# Patient Record
Sex: FEMALE | Race: WHITE | NOT HISPANIC OR LATINO | Employment: OTHER | URBAN - METROPOLITAN AREA
[De-identification: names, ages, dates, MRNs, and addresses within clinical notes are randomized per-mention and may not be internally consistent; named-entity substitution may affect disease eponyms.]

---

## 2020-02-10 ENCOUNTER — TELEPHONE (OUTPATIENT)
Dept: UROLOGY | Facility: MEDICAL CENTER | Age: 70
End: 2020-02-10

## 2020-02-10 NOTE — TELEPHONE ENCOUNTER
Npt calling with insurance information [facility] [id number]   [facility]#[id number]

## 2020-02-21 ENCOUNTER — OFFICE VISIT (OUTPATIENT)
Dept: UROLOGY | Facility: CLINIC | Age: 70
End: 2020-02-21
Payer: MEDICARE

## 2020-02-21 VITALS
HEIGHT: 64 IN | BODY MASS INDEX: 43.02 KG/M2 | WEIGHT: 252 LBS | SYSTOLIC BLOOD PRESSURE: 170 MMHG | DIASTOLIC BLOOD PRESSURE: 90 MMHG | HEART RATE: 89 BPM

## 2020-02-21 DIAGNOSIS — R31.9 HEMATURIA, UNSPECIFIED TYPE: Primary | ICD-10-CM

## 2020-02-21 DIAGNOSIS — R31.29 MICROSCOPIC HEMATURIA: ICD-10-CM

## 2020-02-21 LAB
BACTERIA UR QL AUTO: ABNORMAL /HPF
BILIRUB UR QL STRIP: NEGATIVE
CLARITY UR: CLEAR
COLOR UR: YELLOW
GLUCOSE UR STRIP-MCNC: NEGATIVE MG/DL
HGB UR QL STRIP.AUTO: ABNORMAL
KETONES UR STRIP-MCNC: NEGATIVE MG/DL
LEUKOCYTE ESTERASE UR QL STRIP: NEGATIVE
NITRITE UR QL STRIP: NEGATIVE
NON-SQ EPI CELLS URNS QL MICRO: ABNORMAL /HPF
PH UR STRIP.AUTO: 6 [PH]
POST-VOID RESIDUAL VOLUME, ML POC: 54 ML
PROT UR STRIP-MCNC: NEGATIVE MG/DL
RBC #/AREA URNS AUTO: ABNORMAL /HPF
SL AMB  POCT GLUCOSE, UA: NORMAL
SL AMB LEUKOCYTE ESTERASE,UA: NORMAL
SL AMB POCT BILIRUBIN,UA: NORMAL
SL AMB POCT BLOOD,UA: NORMAL
SL AMB POCT CLARITY,UA: CLEAR
SL AMB POCT COLOR,UA: YELLOW
SL AMB POCT KETONES,UA: NORMAL
SL AMB POCT NITRITE,UA: NORMAL
SL AMB POCT PH,UA: 5
SL AMB POCT SPECIFIC GRAVITY,UA: 1.02
SL AMB POCT URINE PROTEIN: NORMAL
SL AMB POCT UROBILINOGEN: 0.2
SP GR UR STRIP.AUTO: 1.03 (ref 1–1.03)
UROBILINOGEN UR QL STRIP.AUTO: 0.2 E.U./DL
WBC #/AREA URNS AUTO: ABNORMAL /HPF

## 2020-02-21 PROCEDURE — 51798 US URINE CAPACITY MEASURE: CPT | Performed by: PHYSICIAN ASSISTANT

## 2020-02-21 PROCEDURE — 99203 OFFICE O/P NEW LOW 30 MIN: CPT | Performed by: PHYSICIAN ASSISTANT

## 2020-02-21 PROCEDURE — 81001 URINALYSIS AUTO W/SCOPE: CPT | Performed by: PHYSICIAN ASSISTANT

## 2020-02-21 PROCEDURE — 81002 URINALYSIS NONAUTO W/O SCOPE: CPT | Performed by: PHYSICIAN ASSISTANT

## 2020-02-21 RX ORDER — LANOLIN ALCOHOL/MO/W.PET/CERES
2 CREAM (GRAM) TOPICAL
COMMUNITY

## 2020-02-21 RX ORDER — RANITIDINE 150 MG/1
TABLET ORAL
COMMUNITY
Start: 2020-01-10

## 2020-02-21 NOTE — PROGRESS NOTES
1  Hematuria, unspecified type  POCT urine dip    POCT Measure PVR    Urinalysis with microscopic   2  Microscopic hematuria  Basic metabolic panel    CT renal protocol         Assessment and plan:       1  Asymptomatic bacteria  -I reviewed with the patient that her urine cultures revealed very low colony counts of the same bacteria consistent with colonization  Patient is relatively asymptomatic for do not recommend treatment with antibiotics  Reviewed classic signs of urinary infection which she was instructed to contact our office we will concur coordinate formal urine studies  Antibiotics will only be needed during sent symptomatic infections  2  Microscopic hematuria  -persistent microscopic hematuria noted on her studies  We did review that this is likely secondary to her colonization however will do formal evaluation with CT urogram and cystoscopy  Patient verbalized understanding  Lio Pang PA-C      Chief Complaint     Chief Complaint   Patient presents with    Microscopic hematuria     new patient    recurrent uti         History of Present Illness     Emma Wright is a 81 y o  female presenting today as a new patient for recurrent urinary infections  Patient states that she would occasionally have urinary malodor  Every time she has her urine tested it reveals low colony count E coli and or strep  Patient denies any dysuria, suprapubic pressure, gross hematuria, flank pain, nausea, vomiting, fevers, or chills  She does note occasional darker yellow/light brownish colored urine during dehydration  Denies any personal or family history of genitourinary malignancies  Does have a very remote history of smoking approximately 5 pack year history and quit in the 1980s  Urine dip in the office today is leukocyte and nitrite negative, trace blood    Postvoid residual of 54 mL    Laboratory     Lab Results   Component Value Date    CREATININE 0 78 08/29/2016       No results found for: PSA    @RESULTRCNT(1H])@      Review of Systems     Review of Systems   Constitutional: Negative for activity change, appetite change, chills, diaphoresis, fatigue, fever and unexpected weight change  Respiratory: Negative for chest tightness and shortness of breath  Cardiovascular: Negative for chest pain, palpitations and leg swelling  Gastrointestinal: Negative for abdominal distention, abdominal pain, constipation, diarrhea, nausea and vomiting  Genitourinary: Negative for decreased urine volume, difficulty urinating, dysuria, enuresis, flank pain, frequency, genital sores, hematuria and urgency  Musculoskeletal: Negative for back pain, gait problem and myalgias  Skin: Negative for color change, pallor, rash and wound  Psychiatric/Behavioral: Negative for behavioral problems  The patient is not nervous/anxious  Allergies     Allergies   Allergen Reactions    Prevacid [Lansoprazole] Rash       Physical Exam     Physical Exam   Constitutional: She is oriented to person, place, and time  She appears well-developed and well-nourished  No distress  HENT:   Head: Normocephalic and atraumatic  Right Ear: External ear normal    Left Ear: External ear normal    Nose: Nose normal    Eyes: Conjunctivae are normal  Right eye exhibits no discharge  Left eye exhibits no discharge  Neck: Normal range of motion  No tracheal deviation present  Pulmonary/Chest: Effort normal  No respiratory distress  Musculoskeletal: Normal range of motion  She exhibits no edema or deformity  Neurological: She is alert and oriented to person, place, and time  Skin: Skin is warm and dry  She is not diaphoretic  No erythema  No pallor  Psychiatric: She has a normal mood and affect   Her behavior is normal          Vital Signs     Vitals:    02/21/20 1402   BP: 170/90   BP Location: Left arm   Patient Position: Sitting   Cuff Size: Adult   Pulse: 89   Weight: 114 kg (252 lb)   Height: 5' 4" (1 626 m)         Current Medications       Current Outpatient Medications:     aspirin 325 mg tablet, Take 81 mg by mouth daily  , Disp: , Rfl:     Cholecalciferol 50 MCG (2000 UT) CAPS, Take 1 capsule by mouth daily, Disp: , Rfl:     Cyanocobalamin 1000 MCG/15ML LIQD, TAKE ONE TABLET BY MOUTH EVERY DAY, Disp: , Rfl:     glucosamine-chondroitin 500-400 MG tablet, Take 2 tablets by mouth, Disp: , Rfl:     lisinopril (ZESTRIL) 30 mg tablet, Take 30 mg by mouth daily  , Disp: , Rfl:     ranitidine (ZANTAC) 150 mg tablet, TAKE 1 TABLET BY MOUTH TWICE A DAY, Disp: , Rfl:     sertraline (ZOLOFT) 100 mg tablet, Take 100 mg by mouth daily  , Disp: , Rfl:       Active Problems     Patient Active Problem List   Diagnosis    Postmenopausal bleeding    Hypertension    Depression    Sleep apnea         Past Medical History     Past Medical History:   Diagnosis Date    Depression     Hypertension     Sleep apnea          Surgical History     Past Surgical History:   Procedure Laterality Date    CYSTOSCOPY      MI HYSTEROSCOPY,W/ENDO BX N/A 9/6/2016    Procedure: HYSTEROSCOPY; DILATAION & CURETTAGE; POLYPECTOMY ;  Surgeon: Corin Bradshaw MD;  Location: [facility];  Service: Gynecology         Family History     History reviewed  No pertinent family history        Social History     Social History       Radiology

## 2020-03-12 ENCOUNTER — HOSPITAL ENCOUNTER (OUTPATIENT)
Dept: CT IMAGING | Facility: HOSPITAL | Age: 70
Discharge: HOME | End: 2020-03-12
Payer: MEDICARE

## 2020-03-12 DIAGNOSIS — R31.29 MICROSCOPIC HEMATURIA: ICD-10-CM

## 2020-03-12 PROCEDURE — 74178 CT ABD&PLV WO CNTR FLWD CNTR: CPT

## 2020-03-12 RX ADMIN — IOHEXOL 100 ML: 350 INJECTION, SOLUTION INTRAVENOUS at 08:12

## 2020-04-10 ENCOUNTER — TELEPHONE (OUTPATIENT)
Dept: UROLOGY | Facility: CLINIC | Age: 70
End: 2020-04-10

## 2020-09-23 ENCOUNTER — PROCEDURE VISIT (OUTPATIENT)
Dept: UROLOGY | Facility: CLINIC | Age: 70
End: 2020-09-23
Payer: MEDICARE

## 2020-09-23 VITALS
WEIGHT: 251.2 LBS | HEIGHT: 64 IN | SYSTOLIC BLOOD PRESSURE: 142 MMHG | HEART RATE: 78 BPM | DIASTOLIC BLOOD PRESSURE: 98 MMHG | BODY MASS INDEX: 42.88 KG/M2 | TEMPERATURE: 97.7 F

## 2020-09-23 DIAGNOSIS — R31.9 HEMATURIA, UNSPECIFIED TYPE: Primary | ICD-10-CM

## 2020-09-23 LAB
SL AMB  POCT GLUCOSE, UA: NORMAL
SL AMB LEUKOCYTE ESTERASE,UA: NORMAL
SL AMB POCT BILIRUBIN,UA: NORMAL
SL AMB POCT BLOOD,UA: NORMAL
SL AMB POCT CLARITY,UA: CLEAR
SL AMB POCT COLOR,UA: YELLOW
SL AMB POCT KETONES,UA: NORMAL
SL AMB POCT NITRITE,UA: NORMAL
SL AMB POCT PH,UA: 5
SL AMB POCT SPECIFIC GRAVITY,UA: 1.01
SL AMB POCT URINE PROTEIN: NORMAL
SL AMB POCT UROBILINOGEN: 0.2

## 2020-09-23 PROCEDURE — 81002 URINALYSIS NONAUTO W/O SCOPE: CPT | Performed by: UROLOGY

## 2020-09-23 PROCEDURE — 52000 CYSTOURETHROSCOPY: CPT | Performed by: UROLOGY

## 2020-09-23 RX ORDER — ROSUVASTATIN CALCIUM 5 MG/1
5 TABLET, COATED ORAL
COMMUNITY
Start: 2020-08-11

## 2020-09-23 NOTE — PROGRESS NOTES
Office Cystoscopy Procedure Note    Indication:    Hematuria    Informed consent   The risks, benefits, complications, treatment options, and expected outcomes were discussed with the patient  The patient concurred with the proposed plan and provided informed consent  Anesthesia  Lidocaine jelly 2%    Antibiotic prophylaxis   None    Procedure  In the presence of a female nurse, the patient was placed in the supine frog-legged position, was prepped and draped in the usual manner using sterile technique, and 2% lidocaine jelly instilled into the urethra  A 17 F flexible cystoscope was then inserted into the urethra and the urethra and bladder carefully examined  The following findings were noted:    Findings:  Urethra:  Normal  Bladder:  Findings consistent with cystitis cystica, no papillary tumors, urothelial lesions, or concerning findings are present  Ureteral orifices:  Normal  Other findings:  None     Specimens: None                 Complications:    None; patient tolerated the procedure well           Disposition: To home after 30 minute observation  Condition: Stable    Plan:   - patient has now completed a full hematuria evaluation including cystoscopy and contrasted upper tract imaging    SHe has no genitourinary pathology nor signs of malignancy  - at her request I have provided her with the name of a urogynecologist who I recommend for consideration of surgical management for her stress urinary incontinence  - she will follow up with Urology on an as-needed basis, certainly happy to see her again should the need arise

## 2020-10-06 ENCOUNTER — APPOINTMENT (OUTPATIENT)
Dept: LAB | Facility: CLINIC | Age: 70
End: 2020-10-06
Payer: MEDICARE

## 2020-10-06 ENCOUNTER — TRANSCRIBE ORDERS (OUTPATIENT)
Dept: LAB | Facility: CLINIC | Age: 70
End: 2020-10-06

## 2020-10-06 DIAGNOSIS — R31.29 MICROSCOPIC HEMATURIA: ICD-10-CM

## 2020-10-06 DIAGNOSIS — E55.9 VITAMIN D DEFICIENCY: ICD-10-CM

## 2020-10-06 DIAGNOSIS — R73.01 IMPAIRED FASTING GLUCOSE: Primary | ICD-10-CM

## 2020-10-06 DIAGNOSIS — E53.8 VITAMIN B12 DEFICIENCY: ICD-10-CM

## 2020-10-06 DIAGNOSIS — R73.01 IMPAIRED FASTING GLUCOSE: ICD-10-CM

## 2020-10-06 DIAGNOSIS — E78.2 MIXED HYPERLIPIDEMIA: ICD-10-CM

## 2020-10-06 LAB
25(OH)D3 SERPL-MCNC: 39 NG/ML (ref 30–100)
ALBUMIN SERPL BCP-MCNC: 4.1 G/DL (ref 3.5–5)
ALP SERPL-CCNC: 80 U/L (ref 46–116)
ALT SERPL W P-5'-P-CCNC: 25 U/L (ref 12–78)
ANION GAP SERPL CALCULATED.3IONS-SCNC: 2 MMOL/L (ref 4–13)
AST SERPL W P-5'-P-CCNC: 17 U/L (ref 5–45)
BILIRUB SERPL-MCNC: 0.4 MG/DL (ref 0.2–1)
BUN SERPL-MCNC: 19 MG/DL (ref 5–25)
CALCIUM SERPL-MCNC: 10 MG/DL (ref 8.3–10.1)
CHLORIDE SERPL-SCNC: 108 MMOL/L (ref 100–108)
CHOLEST SERPL-MCNC: 142 MG/DL (ref 50–200)
CO2 SERPL-SCNC: 29 MMOL/L (ref 21–32)
CREAT SERPL-MCNC: 0.84 MG/DL (ref 0.6–1.3)
EST. AVERAGE GLUCOSE BLD GHB EST-MCNC: 117 MG/DL
GFR SERPL CREATININE-BSD FRML MDRD: 72 ML/MIN/1.73SQ M
GLUCOSE P FAST SERPL-MCNC: 102 MG/DL (ref 65–99)
HBA1C MFR BLD: 5.7 %
HDLC SERPL-MCNC: 35 MG/DL
LDLC SERPL CALC-MCNC: 74 MG/DL (ref 0–100)
NONHDLC SERPL-MCNC: 107 MG/DL
POTASSIUM SERPL-SCNC: 4.4 MMOL/L (ref 3.5–5.3)
PROT SERPL-MCNC: 7.4 G/DL (ref 6.4–8.2)
SODIUM SERPL-SCNC: 139 MMOL/L (ref 136–145)
TRIGL SERPL-MCNC: 167 MG/DL
VIT B12 SERPL-MCNC: 650 PG/ML (ref 100–900)

## 2020-10-06 PROCEDURE — 82607 VITAMIN B-12: CPT

## 2020-10-06 PROCEDURE — 36415 COLL VENOUS BLD VENIPUNCTURE: CPT

## 2020-10-06 PROCEDURE — 83036 HEMOGLOBIN GLYCOSYLATED A1C: CPT

## 2020-10-06 PROCEDURE — 80053 COMPREHEN METABOLIC PANEL: CPT

## 2020-10-06 PROCEDURE — 80061 LIPID PANEL: CPT

## 2020-10-06 PROCEDURE — 82306 VITAMIN D 25 HYDROXY: CPT

## 2020-11-17 ENCOUNTER — TRANSCRIBE ORDERS (OUTPATIENT)
Dept: ADMINISTRATIVE | Facility: HOSPITAL | Age: 70
End: 2020-11-17

## 2020-11-17 DIAGNOSIS — Z12.31 OTHER SCREENING MAMMOGRAM: Primary | ICD-10-CM

## 2021-02-23 ENCOUNTER — IMMUNIZATIONS (OUTPATIENT)
Dept: FAMILY MEDICINE CLINIC | Facility: HOSPITAL | Age: 71
End: 2021-02-23

## 2021-02-23 DIAGNOSIS — Z23 ENCOUNTER FOR IMMUNIZATION: Primary | ICD-10-CM

## 2021-02-23 PROCEDURE — 91300 SARS-COV-2 / COVID-19 MRNA VACCINE (PFIZER-BIONTECH) 30 MCG: CPT

## 2021-02-23 PROCEDURE — 0001A SARS-COV-2 / COVID-19 MRNA VACCINE (PFIZER-BIONTECH) 30 MCG: CPT

## 2021-03-16 ENCOUNTER — IMMUNIZATIONS (OUTPATIENT)
Dept: FAMILY MEDICINE CLINIC | Facility: HOSPITAL | Age: 71
End: 2021-03-16

## 2021-03-16 DIAGNOSIS — Z23 ENCOUNTER FOR IMMUNIZATION: Primary | ICD-10-CM

## 2021-03-16 PROCEDURE — 91300 SARS-COV-2 / COVID-19 MRNA VACCINE (PFIZER-BIONTECH) 30 MCG: CPT

## 2021-03-16 PROCEDURE — 0002A SARS-COV-2 / COVID-19 MRNA VACCINE (PFIZER-BIONTECH) 30 MCG: CPT

## 2021-06-14 ENCOUNTER — TRANSCRIBE ORDERS (OUTPATIENT)
Dept: LAB | Facility: CLINIC | Age: 71
End: 2021-06-14

## 2021-06-14 ENCOUNTER — APPOINTMENT (OUTPATIENT)
Dept: LAB | Facility: CLINIC | Age: 71
End: 2021-06-14
Payer: MEDICARE

## 2021-06-14 DIAGNOSIS — R73.01 IMPAIRED FASTING GLUCOSE: ICD-10-CM

## 2021-06-14 DIAGNOSIS — E78.2 MIXED HYPERLIPIDEMIA: ICD-10-CM

## 2021-06-14 DIAGNOSIS — I10 ESSENTIAL HYPERTENSION: ICD-10-CM

## 2021-06-14 DIAGNOSIS — I10 ESSENTIAL HYPERTENSION: Primary | ICD-10-CM

## 2021-06-14 LAB
ALBUMIN SERPL BCP-MCNC: 4 G/DL (ref 3.5–5)
ALP SERPL-CCNC: 72 U/L (ref 46–116)
ALT SERPL W P-5'-P-CCNC: 24 U/L (ref 12–78)
ANION GAP SERPL CALCULATED.3IONS-SCNC: 5 MMOL/L (ref 4–13)
AST SERPL W P-5'-P-CCNC: 20 U/L (ref 5–45)
BASOPHILS # BLD AUTO: 0.07 THOUSANDS/ΜL (ref 0–0.1)
BASOPHILS NFR BLD AUTO: 1 % (ref 0–1)
BILIRUB SERPL-MCNC: 0.53 MG/DL (ref 0.2–1)
BUN SERPL-MCNC: 17 MG/DL (ref 5–25)
CALCIUM SERPL-MCNC: 9.5 MG/DL (ref 8.3–10.1)
CHLORIDE SERPL-SCNC: 108 MMOL/L (ref 100–108)
CHOLEST SERPL-MCNC: 139 MG/DL (ref 50–200)
CO2 SERPL-SCNC: 26 MMOL/L (ref 21–32)
CREAT SERPL-MCNC: 0.72 MG/DL (ref 0.6–1.3)
EOSINOPHIL # BLD AUTO: 0.21 THOUSAND/ΜL (ref 0–0.61)
EOSINOPHIL NFR BLD AUTO: 3 % (ref 0–6)
ERYTHROCYTE [DISTWIDTH] IN BLOOD BY AUTOMATED COUNT: 13.5 % (ref 11.6–15.1)
EST. AVERAGE GLUCOSE BLD GHB EST-MCNC: 126 MG/DL
GFR SERPL CREATININE-BSD FRML MDRD: 86 ML/MIN/1.73SQ M
GLUCOSE P FAST SERPL-MCNC: 90 MG/DL (ref 65–99)
HBA1C MFR BLD: 6 %
HCT VFR BLD AUTO: 44 % (ref 34.8–46.1)
HDLC SERPL-MCNC: 29 MG/DL
HGB BLD-MCNC: 14.4 G/DL (ref 11.5–15.4)
IMM GRANULOCYTES # BLD AUTO: 0.03 THOUSAND/UL (ref 0–0.2)
IMM GRANULOCYTES NFR BLD AUTO: 0 % (ref 0–2)
LDLC SERPL CALC-MCNC: 77 MG/DL (ref 0–100)
LYMPHOCYTES # BLD AUTO: 1.9 THOUSANDS/ΜL (ref 0.6–4.47)
LYMPHOCYTES NFR BLD AUTO: 28 % (ref 14–44)
MCH RBC QN AUTO: 30.7 PG (ref 26.8–34.3)
MCHC RBC AUTO-ENTMCNC: 32.7 G/DL (ref 31.4–37.4)
MCV RBC AUTO: 94 FL (ref 82–98)
MONOCYTES # BLD AUTO: 0.58 THOUSAND/ΜL (ref 0.17–1.22)
MONOCYTES NFR BLD AUTO: 9 % (ref 4–12)
NEUTROPHILS # BLD AUTO: 4 THOUSANDS/ΜL (ref 1.85–7.62)
NEUTS SEG NFR BLD AUTO: 59 % (ref 43–75)
NONHDLC SERPL-MCNC: 110 MG/DL
NRBC BLD AUTO-RTO: 0 /100 WBCS
PLATELET # BLD AUTO: 266 THOUSANDS/UL (ref 149–390)
PMV BLD AUTO: 11.4 FL (ref 8.9–12.7)
POTASSIUM SERPL-SCNC: 4.3 MMOL/L (ref 3.5–5.3)
PROT SERPL-MCNC: 7.4 G/DL (ref 6.4–8.2)
RBC # BLD AUTO: 4.69 MILLION/UL (ref 3.81–5.12)
SODIUM SERPL-SCNC: 139 MMOL/L (ref 136–145)
TRIGL SERPL-MCNC: 163 MG/DL
WBC # BLD AUTO: 6.79 THOUSAND/UL (ref 4.31–10.16)

## 2021-06-14 PROCEDURE — 85025 COMPLETE CBC W/AUTO DIFF WBC: CPT

## 2021-06-14 PROCEDURE — 80053 COMPREHEN METABOLIC PANEL: CPT

## 2021-06-14 PROCEDURE — 36415 COLL VENOUS BLD VENIPUNCTURE: CPT

## 2021-06-14 PROCEDURE — 83036 HEMOGLOBIN GLYCOSYLATED A1C: CPT

## 2021-06-14 PROCEDURE — 80061 LIPID PANEL: CPT

## 2021-07-30 ENCOUNTER — PREP FOR PROCEDURE (OUTPATIENT)
Dept: GASTROENTEROLOGY | Facility: CLINIC | Age: 71
End: 2021-07-30

## 2021-07-30 DIAGNOSIS — Z86.010 HISTORY OF COLON POLYPS: Primary | ICD-10-CM

## 2021-08-02 ENCOUNTER — OFFICE VISIT (OUTPATIENT)
Dept: OBGYN CLINIC | Facility: CLINIC | Age: 71
End: 2021-08-02
Payer: MEDICARE

## 2021-08-02 VITALS
DIASTOLIC BLOOD PRESSURE: 80 MMHG | HEIGHT: 64 IN | BODY MASS INDEX: 43.02 KG/M2 | WEIGHT: 252 LBS | SYSTOLIC BLOOD PRESSURE: 120 MMHG

## 2021-08-02 DIAGNOSIS — N95.0 PMB (POSTMENOPAUSAL BLEEDING): Primary | ICD-10-CM

## 2021-08-02 PROCEDURE — 99203 OFFICE O/P NEW LOW 30 MIN: CPT | Performed by: OBSTETRICS & GYNECOLOGY

## 2021-08-02 NOTE — PROGRESS NOTES
The patient is here because she is having post-menopausal bleeding  She is new to our office  The bleeding started on 7/30/21  The bleeding was bright red spotting after wiping  No cramping  The patient had an endometrial biopsy done in 2016  They found a cervical polyp which was removed  The patient had this procedure done by Dr Jeff Mason at [facility]  She last saw Dr Jeff Mason two years ago  No urinary symptoms

## 2021-08-02 NOTE — PROGRESS NOTES
42-year-old  2 para 2 presents to the office as a new patient complaining of 1 episode of bright red vaginal bleeding 3 days ago  Patient denies any pelvic pain  Patient is on 81 mg of aspirin but denies any bruising nose bleeds bleeding with brushing her teeth  Patient was placed on pregnant zone for arthritis of her neck  No history of HRT  Patient is sexually active  Last episode of intercourse was a month ago with no bleeding  Patient denies any bowel or bladder problems  Colonoscopy  showed polyps  Patient is due for repeat  Patient was seen for routine gyn exam   Patient has a history of a D&C for postmenopausal bleeding 2016  Pathology was reviewed and showed benign endometrial polyps  Physical exam:  Abdomen obese soft nontender  External genitalia atrophic  No bleeding no lesions  Cervix no lesions mobile nontender  Uterus mobile nontender  Adnexa not palpable secondary to body habitus  Impression:  Postmenopausal bleeding which occurred 3 days ago  No bleeding since  No pelvic pain  History of endometrial polyps diagnosed on [facility]  2016  Plan:  Check pelvic ultrasound    May need endometrial biopsy versus polypectomy D&C

## 2021-08-05 ENCOUNTER — HOSPITAL ENCOUNTER (OUTPATIENT)
Dept: ULTRASOUND IMAGING | Facility: HOSPITAL | Age: 71
Discharge: HOME | End: 2021-08-05
Payer: MEDICARE

## 2021-08-05 DIAGNOSIS — N95.0 PMB (POSTMENOPAUSAL BLEEDING): ICD-10-CM

## 2021-08-05 PROCEDURE — 76830 TRANSVAGINAL US NON-OB: CPT

## 2021-08-05 PROCEDURE — 76856 US EXAM PELVIC COMPLETE: CPT

## 2021-08-10 ENCOUNTER — TELEPHONE (OUTPATIENT)
Dept: OBGYN CLINIC | Facility: CLINIC | Age: 71
End: 2021-08-10

## 2021-08-10 NOTE — TELEPHONE ENCOUNTER
Thickened endometrium on pelvic ultrasound  Patient had postmenopausal bleeding      Plan:  Office visit for her hysteroscopy endometrial biopsy

## 2021-08-17 ENCOUNTER — PROCEDURE VISIT (OUTPATIENT)
Dept: OBGYN CLINIC | Facility: CLINIC | Age: 71
End: 2021-08-17
Payer: MEDICARE

## 2021-08-17 VITALS
SYSTOLIC BLOOD PRESSURE: 140 MMHG | WEIGHT: 250 LBS | HEIGHT: 64 IN | DIASTOLIC BLOOD PRESSURE: 82 MMHG | BODY MASS INDEX: 42.68 KG/M2

## 2021-08-17 DIAGNOSIS — R93.89 ENDOMETRIAL THICKENING ON ULTRASOUND: Primary | ICD-10-CM

## 2021-08-17 DIAGNOSIS — N95.0 PMB (POSTMENOPAUSAL BLEEDING): ICD-10-CM

## 2021-08-17 PROCEDURE — 88305 TISSUE EXAM BY PATHOLOGIST: CPT | Performed by: PATHOLOGY

## 2021-08-17 PROCEDURE — 58558 HYSTEROSCOPY BIOPSY: CPT | Performed by: OBSTETRICS & GYNECOLOGY

## 2021-08-17 NOTE — PROGRESS NOTES
Endometrial biopsy    Date/Time: 8/17/2021 2:42 PM  Performed by: Mitch Velazquez MD  Authorized by: Mitch Velazquez MD   Universal Protocol:  Consent: Verbal consent obtained  Written consent obtained  Risks and benefits: risks, benefits and alternatives were discussed  Consent given by: patient  Time out: Immediately prior to procedure a "time out" was called to verify the correct patient, procedure, equipment, support staff and site/side marked as required  Timeout called at: 8/17/2021 2:43 PM   Patient understanding: patient states understanding of the procedure being performed  Patient consent: the patient's understanding of the procedure matches consent given  Procedure consent: procedure consent matches procedure scheduled  Relevant documents: relevant documents present and verified  Patient identity confirmed: verbally with patient      Indication:     Indications: Post-menopausal bleeding      Chronicity of post-menopausal bleeding:  New    Progression of post-menopausal bleeding:  Unchanged  Procedure:     Procedure: endometrial biopsy with Pipelle      A bivalve speculum was placed in the vagina: yes      Cervix cleaned and prepped: yes      A paracervical block was performed: no      An intracervical block was performed: no      The cervix was dilated: no      Uterus sound depth (cm):  7    Specimen collected: specimen collected and sent to pathology      Patient tolerated procedure well with no complications: yes    Findings:     Uterus size:  9-10 weeks    Cervix: normal      Adnexa: normal    Comments:     Procedure comments:  Hysteroscopic findings: Thickened endometrial tissue no polyps or fibroids seen  Pelvic ultrasound:  Uterus 9 9 x 4 9 x 6 0 cm   1 3 cm fibroid  Endometrial stripe 16 mm  Normal right ovary    Left ovary not visualized

## 2021-08-24 ENCOUNTER — TELEPHONE (OUTPATIENT)
Dept: OBGYN CLINIC | Facility: CLINIC | Age: 71
End: 2021-08-24

## 2021-09-03 ENCOUNTER — ANESTHESIA (OUTPATIENT)
Dept: GASTROENTEROLOGY | Facility: AMBULATORY SURGERY CENTER | Age: 71
End: 2021-09-03

## 2021-09-03 ENCOUNTER — HOSPITAL ENCOUNTER (OUTPATIENT)
Dept: GASTROENTEROLOGY | Facility: AMBULATORY SURGERY CENTER | Age: 71
Discharge: HOME | End: 2021-09-03
Payer: MEDICARE

## 2021-09-03 ENCOUNTER — ANESTHESIA EVENT (OUTPATIENT)
Dept: GASTROENTEROLOGY | Facility: AMBULATORY SURGERY CENTER | Age: 71
End: 2021-09-03

## 2021-09-03 VITALS
HEART RATE: 63 BPM | RESPIRATION RATE: 18 BRPM | TEMPERATURE: 96.1 F | BODY MASS INDEX: 43.54 KG/M2 | HEIGHT: 64 IN | OXYGEN SATURATION: 96 % | WEIGHT: 255 LBS | DIASTOLIC BLOOD PRESSURE: 91 MMHG | SYSTOLIC BLOOD PRESSURE: 154 MMHG

## 2021-09-03 DIAGNOSIS — Z86.010 HISTORY OF COLON POLYPS: ICD-10-CM

## 2021-09-03 PROCEDURE — 45385 COLONOSCOPY W/LESION REMOVAL: CPT | Performed by: INTERNAL MEDICINE

## 2021-09-03 PROCEDURE — 00811 ANES LWR INTST NDSC NOS: CPT | Performed by: NURSE ANESTHETIST, CERTIFIED REGISTERED

## 2021-09-03 PROCEDURE — 88305 TISSUE EXAM BY PATHOLOGIST: CPT | Performed by: PATHOLOGY

## 2021-09-03 RX ORDER — SODIUM CHLORIDE 9 MG/ML
20 INJECTION, SOLUTION INTRAVENOUS CONTINUOUS
Status: DISCONTINUED | OUTPATIENT
Start: 2021-09-03 | End: 2021-09-07 | Stop reason: HOSPADM

## 2021-09-03 RX ORDER — ASPIRIN 81 MG/1
81 TABLET ORAL DAILY
COMMUNITY

## 2021-09-03 RX ORDER — SODIUM CHLORIDE 9 MG/ML
30 INJECTION, SOLUTION INTRAVENOUS CONTINUOUS
Status: DISCONTINUED | OUTPATIENT
Start: 2021-09-03 | End: 2021-09-07 | Stop reason: HOSPADM

## 2021-09-03 RX ORDER — SODIUM CHLORIDE 9 MG/ML
INJECTION, SOLUTION INTRAVENOUS CONTINUOUS PRN
Status: DISCONTINUED | OUTPATIENT
Start: 2021-09-03 | End: 2021-09-03

## 2021-09-03 RX ORDER — PROPOFOL 10 MG/ML
INJECTION, EMULSION INTRAVENOUS AS NEEDED
Status: DISCONTINUED | OUTPATIENT
Start: 2021-09-03 | End: 2021-09-03

## 2021-09-03 RX ADMIN — PROPOFOL 50 MG: 10 INJECTION, EMULSION INTRAVENOUS at 11:35

## 2021-09-03 RX ADMIN — PROPOFOL 50 MG: 10 INJECTION, EMULSION INTRAVENOUS at 11:39

## 2021-09-03 RX ADMIN — SODIUM CHLORIDE: 9 INJECTION, SOLUTION INTRAVENOUS at 11:02

## 2021-09-03 RX ADMIN — PROPOFOL 50 MG: 10 INJECTION, EMULSION INTRAVENOUS at 11:37

## 2021-09-03 RX ADMIN — PROPOFOL 100 MG: 10 INJECTION, EMULSION INTRAVENOUS at 11:33

## 2021-09-03 NOTE — DISCHARGE INSTRUCTIONS
Colonoscopy   WHAT YOU NEED TO KNOW:   A colonoscopy is a procedure to examine the inside of your colon (intestine) with a scope  Polyps or tissue growths may have been removed during your colonoscopy  It is normal to feel bloated and to have some abdominal discomfort  You should be passing gas  If you have hemorrhoids or you had polyps removed, you may have a small amount of bleeding  DISCHARGE INSTRUCTIONS:   Seek care immediately if:    You have sudden, severe abdominal pain   You have problems swallowing   You have a large amount of black, sticky bowel movements or blood in your bowel movements   You have sudden trouble breathing   You feel weak, lightheaded, or faint or your heart beats faster than normal for you  Contact your healthcare provider if:    You have a fever and chills   You have nausea or are vomiting   Your abdomen is bloated or feels full and hard   You have abdominal pain   You have black, sticky bowel movements or blood in your bowel movements   You have not had a bowel movement for 3 days after your procedure   You have rash or hives   You have questions or concerns about your procedure  Activity:    Do not lift, strain, or run for 24 hours after your procedure   Rest after your procedure  You have been given medicine to relax you  Do not drive or make important decisions until the day after your procedure  Return to your normal activity as directed   Relieve gas and discomfort from bloating by lying on your right side with a heating pad on your abdomen  You may need to take short walks to help the gas move out  Eat small meals until bloating is relieved  Follow up with your healthcare provider as directed: Write down your questions so you remember to ask them during your visits  If you take a blood thinner, please review the specific instructions from your endoscopist about when you should resume it   These can be found in the Recommendation and Your Medication list sections of this After Visit Summary  High Fiber Diet   AMBULATORY CARE:   A high-fiber diet  includes foods that have a high amount of fiber  Fiber is the part of fruits, vegetables, and grains that is not broken down by your body  Fiber keeps your bowel movements regular  Fiber can also help lower your cholesterol level, control blood sugar in people with diabetes, and relieve constipation  Fiber can also help you control your weight because it helps you feel full faster  Most adults should eat 25 to 35 grams of fiber each day  Talk to your dietitian or healthcare provider about the amount of fiber you need  Good sources of fiber:       · Foods with at least 4 grams of fiber per serving:      ? ? to ½ cup of high-fiber cereal (check the nutrition label on the box)    ? ½ cup of blackberries or raspberries    ? 4 dried prunes    ? 1 cooked artichoke    ? ½ cup of cooked legumes, such as lentils, or red, kidney, and velásquez beans    · Foods with 1 to 3 grams of fiber per serving:      ? 1 slice of whole-wheat, pumpernickel, or rye bread    ? ½ cup of cooked brown rice    ? 4 whole-wheat crackers    ? 1 cup of oatmeal    ? ½ cup of cereal with 1 to 3 grams of fiber per serving (check the nutrition label on the box)    ? 1 small piece of fruit, such as an apple, banana, pear, kiwi, or orange    ? 3 dates    ? ½ cup of canned apricots, fruit cocktail, peaches, or pears    ? ½ cup of raw or cooked vegetables, such as carrots, cauliflower, cabbage, spinach, squash, or corn  Ways that you can increase fiber in your diet:   · Choose brown or wild rice instead of white rice  · Use whole wheat flour in recipes instead of white or all-purpose flour  · Add beans and peas to casseroles or soups  · Choose fresh fruit and vegetables with peels or skins on instead of juices  Other diet guidelines to follow:   · Add fiber to your diet slowly    You may have abdominal discomfort, bloating, and gas if you add fiber to your diet too quickly  · Drink plenty of liquids as you add fiber to your diet  You may have nausea or develop constipation if you do not drink enough water  Ask how much liquid to drink each day and which liquids are best for you  © Copyright [employer] 2021 Information is for End User's use only and may not be sold, redistributed or otherwise used for commercial purposes  All illustrations and images included in CareNotes® are the copyrighted property of A D A M , Inc  or [facility] Emma Broussard   The above information is an  only  It is not intended as medical advice for individual conditions or treatments  Talk to your doctor, nurse or pharmacist before following any medical regimen to see if it is safe and effective for you  Diverticulosis   WHAT YOU NEED TO KNOW:   What is diverticulosis? Diverticulosis is a condition that causes small pockets called diverticula to form in your intestine  These pockets make it difficult for bowel movements to pass through your digestive system  What causes diverticulosis? Diverticula form when muscles have to work hard to move bowel movements through the intestine  The force causes bulges to form at weak areas in the intestine  This may happen if you eat foods that are low in fiber  Fiber helps give your bowel movements more bulk so they are larger and easier to move through your colon  The following may increase your risk of diverticulosis:  · A history of constipation    · Age 36 or older    · Obesity    · Lack of exercise    What are the signs and symptoms of diverticulosis? Diverticulosis usually does not cause any signs or symptoms  It may cause any of the following in some people:  · Pain or discomfort in your lower abdomen    · Abdominal bloating    · Constipation or diarrhea    How is diverticulosis diagnosed?   Your healthcare provider will examine you and ask about your bowel movements, diet, and symptoms  He or she will also ask about any medical conditions you have or medicines you take  You may need any of the following:  · Blood tests  may be done to check for signs of inflammation  · A barium enema  is an x-ray of your colon that may show diverticula  A tube is put into your anus, and a liquid called barium is put through the tube  Barium is used so that healthcare providers can see your colon more clearly  · Flexible sigmoidoscopy  is a test to look for any changes in your lower intestines and rectum  It may also show the cause of any bleeding or pain  A soft, bendable tube with a light on the end will be put into your anus  It will then be moved forward into your intestine  · A colonoscopy  is used to look at your whole colon  A scope (long bendable tube with a light on the end) is used to take pictures  This test may show diverticula  · A CT scan , or CAT scan, may show diverticula  You may be given contrast liquid before the scan  Tell the healthcare provider if you have ever had an allergic reaction to contrast liquid  How is diverticulosis managed? The goal of treatment is to manage any symptoms you have and prevent other problems such as diverticulitis  Diverticulitis is swelling or infection of the diverticula  Your healthcare provider may recommend any of the following:  · Eat a variety of high-fiber foods  High-fiber foods help you have regular bowel movements  High-fiber foods include cooked beans, fruits, vegetables, and some cereals  Most adults need 25 to 35 grams of fiber each day  Your healthcare provider may recommend that you have more  Ask your healthcare provider how much fiber you need  Increase fiber slowly  You may have abdominal discomfort, bloating, and gas if you add fiber to your diet too quickly  You may need to take a fiber supplement if you are not getting enough fiber from food           · Medicines  to soften your bowel movements may be given  You may also need medicines to treat symptoms such as bloating and pain  · Drink liquids as directed  You may need to drink 2 to 3 liters (8 to 12 cups) of liquids every day  Ask your healthcare provider how much liquid to drink each day and which liquids are best for you  · Apply heat  on your abdomen for 20 to 30 minutes every 2 hours for as many days as directed  Heat helps decrease pain and muscle spasms  How can I help prevent diverticulitis or other symptoms? The following may help decrease your risk for diverticulitis or symptoms, such as bleeding  Talk to your provider about these or other things you can do to prevent problems that may occur with diverticulosis  · Exercise regularly  Ask your healthcare provider about the best exercise plan for you  Exercise can help you have regular bowel movements  Get 30 minutes of exercise on most days of the week  · Maintain a healthy weight  Ask your healthcare provider how much you should weigh  Ask him or her to help you create a weight loss plan if you are overweight  · Do not smoke  Nicotine and other chemicals in cigarettes increase your risk for diverticulitis  Ask your healthcare provider for information if you currently smoke and need help to quit  E-cigarettes or smokeless tobacco still contain nicotine  Talk to your healthcare provider before you use these products  · Ask your healthcare provider if it is safe to take NSAIDs  NSAIDs may increase your risk of diverticulitis  When should I seek immediate care? · You have severe pain on the left side of your lower abdomen  · Your bowel movements are bright or dark red  When should I contact my healthcare provider? · You have a fever and chills  · You feel dizzy or lightheaded  · You have nausea, or you are vomiting  · You have a change in your bowel movements  · You have questions or concerns about your condition or care      CARE AGREEMENT: You have the right to help plan your care  Learn about your health condition and how it may be treated  Discuss treatment options with your healthcare providers to decide what care you want to receive  You always have the right to refuse treatment  The above information is an  only  It is not intended as medical advice for individual conditions or treatments  Talk to your doctor, nurse or pharmacist before following any medical regimen to see if it is safe and effective for you  © Copyright [employer] 2021 Information is for End User's use only and may not be sold, redistributed or otherwise used for commercial purposes  All illustrations and images included in CareNotes® are the copyrighted property of A D A M , Inc  or [facility]   Colorectal Polyps   WHAT YOU NEED TO KNOW:   What are colorectal polyps? Colorectal polyps are small growths of tissue in the lining of the colon and rectum  Most polyps are usually benign (not cancer)  Certain types of polyps, called adenomatous polyps, may turn into cancer  What increases my risk for colorectal polyps? The exact cause of colorectal polyps is unknown  The following may increase your risk:  · Older age    · Foods high in fat and low in fiber    · Family history of polyps    · Intestinal diseases, such as Crohn disease or ulcerative colitis    · Smoking cigarettes or drinking alcohol    · Lack of physical activity, such as exercise    · Obesity    What are the signs and symptoms of colorectal polyps? · Blood in your bowel movement or bleeding from the rectum    · Change in bowel movement habits, such as diarrhea or constipation    · Abdominal pain    How are colorectal polyps diagnosed? You should have fecal blood screening 1 time each year for colorectal disease if you are older than 50 years  You should be screened earlier if you have an intestinal disease or a family history of polyps or colorectal cancer   During this screening, a sample of your bowel movement is checked for blood, which may be an early sign of colorectal polyps or cancer  You may also need any of the following tests:  · A digital rectal exam  means your healthcare provider will use a finger to check for polyps  · A barium enema  is an x-ray of the colon  A tube is put into your anus, and a liquid called barium is put through the tube  Barium is used so that healthcare providers can see your colon better on the x-ray film  · A virtual colonoscopy  is a CT scan that takes pictures of the inside of your colon and rectum  A small, flexible tube is put into your rectum and air or carbon dioxide (gas) is used to expand your colon  This lets healthcare providers clearly see your colon and any polyps on a monitor  · Colonoscopy or sigmoidoscopy  are procedures to help your healthcare provider see the inside of your colon  He or she will use a flexible tube with a small light and camera on the end  During a sigmoidoscopy, your healthcare provider will only look at rectum and lower colon  During a colonoscopy, he or she will look at the full length of your colon  A small amount of tissue may be removed from your colon for tests  How are colorectal polyps treated? Small, benign polyps may not need treatment  Your healthcare provider will check the polyp over time to make sure it is not changing  Polyps that are cancer may be removed with one of the following:  · A polypectomy  is a minimally invasive procedure to remove a polyp during a colonoscopy or sigmoidoscopy  Your healthcare provider may need to remove the polyp with a laparoscope  Laparoscopy is done by inserting a small, flexible scope into incisions made on your abdomen  · Surgery  may be needed to remove large or deep polyps that cannot be removed in a polypectomy  Tissues or lymph nodes near a polyp may also be removed  This helps stop cancer from spreading      What can I do to lower my risk for colorectal polyps? · Eat a variety of healthy foods  Healthy foods include fruit, vegetables, whole-grain breads, low-fat dairy products, beans, lean meat, and fish  Ask if you need to be on a special diet  · Maintain a healthy weight  Ask your healthcare provider what a healthy weight is for you  Ask him or her to help with a weight loss plan if you are overweight  · Exercise as directed  Begin to exercise slowly and do more as you get stronger  Talk with your healthcare provider before you start an exercise program          · Limit alcohol  Your risk for polyps increases the more you drink  · Do not smoke  Nicotine and other chemicals in cigarettes and cigars increase your risk for polyps  Ask your healthcare provider for information if you currently smoke and need help to quit  E-cigarettes or smokeless tobacco still contain nicotine  Talk to your healthcare provider before you use these products  Where can I find more information? · Samuel Gamboa ([facility])  [address], [place] [address]  Phone: [phone]  Web Address: Payal Lopes  [facility] gov    Call your local emergency number (911 in the [address]) if:   · You have sudden shortness of breath  · You have a fast heart rate, fast breathing, or are too dizzy to stand up  When should I seek immediate care? · You have severe abdominal pain  · You see blood in your bowel movement  When should I call my doctor? · You have a fever  · You have chills, a cough, or feel weak and achy  · You have abdominal pain that does not go away or gets worse after you take medicine  · Your abdomen is swollen  · You are losing weight without trying  · You have questions or concerns about your condition or care  CARE AGREEMENT:   You have the right to help plan your care  Learn about your health condition and how it may be treated   Discuss treatment options with your healthcare providers to decide what care you want to receive  You always have the right to refuse treatment  The above information is an  only  It is not intended as medical advice for individual conditions or treatments  Talk to your doctor, nurse or pharmacist before following any medical regimen to see if it is safe and effective for you  © Copyright [employer] 2021 Information is for End User's use only and may not be sold, redistributed or otherwise used for commercial purposes   All illustrations and images included in CareNotes® are the copyrighted property of A D A M , Inc  or [address] [place] [address]

## 2021-09-03 NOTE — H&P
History and Physical - [facility]  Ange Clarke 78 y o  female MRN: [id number]                  HPI: Ange Clarke is a 78y o  year old female who presents for history of polyps      REVIEW OF SYSTEMS: Per the HPI, and otherwise unremarkable  Historical Information   Past Medical History:   Diagnosis Date    Anxiety     Cervical herniated disc     9/3/21  good ROM-getting PT    Colon polyp     CPAP (continuous positive airway pressure) dependence     Depression     GERD (gastroesophageal reflux disease)     Hyperlipidemia     Hypertension     Sleep apnea     Syncope and collapse     9/3/21  episode of passing out 3-4 weeks ago, awoke quickly; injured tailbone, did not go to ER       Past Surgical History:   Procedure Laterality Date    COLONOSCOPY      polyp    COLONOSCOPY      CYSTOSCOPY      CA HYSTEROSCOPY,W/ENDO BX N/A 2016    Procedure: HYSTEROSCOPY; DILATAION & CURETTAGE; POLYPECTOMY ;  Surgeon: Collin Soni MD;  Location: [facility];  Service: Gynecology     Social History   Social History     Substance and Sexual Activity   Alcohol Use Yes    Alcohol/week: 7 0 standard drinks    Types: 7 Standard drinks or equivalent per week    Comment: cocktail nightly-1     Social History     Substance and Sexual Activity   Drug Use Never     Social History     Tobacco Use   Smoking Status Former Smoker    Packs/day: 0 50    Types: Cigarettes    Quit date: 38    Years since quittin 7   Smokeless Tobacco Never Used     Family History   Problem Relation Age of Onset    COPD Mother     Heart disease Father        Meds/Allergies       Current Outpatient Medications:     aspirin (ECOTRIN LOW STRENGTH) 81 mg EC tablet    Cholecalciferol 50 MCG ( UT) CAPS    Cyanocobalamin 1000 MCG/15ML LIQD    glucosamine-chondroitin 500-400 MG tablet    lisinopril (ZESTRIL) 30 mg tablet    ranitidine (ZANTAC) 150 mg tablet    rosuvastatin (CRESTOR) 5 mg tablet   sertraline (ZOLOFT) 100 mg tablet    Current Facility-Administered Medications:     sodium chloride 0 9 % infusion, 30 mL/hr, Intravenous, Continuous    sodium chloride 0 9 % infusion, 20 mL/hr, Intravenous, Continuous    Facility-Administered Medications Ordered in Other Encounters:     sodium chloride 0 9 % infusion, , Intravenous, Continuous PRN, New Bag at 09/03/21 1102    Allergies   Allergen Reactions    Prevacid [Lansoprazole] Rash       Objective     /72   Pulse 70   Temp (!) 96 1 °F (35 6 °C) (Skin)   Resp 18   Ht 5' 4" (1 626 m)   Wt 116 kg (255 lb)   LMP 01/01/2002 (Approximate)   SpO2 96%   BMI 43 77 kg/m²       PHYSICAL EXAM    Gen: NAD  Head: NCAT  CV: RRR  CHEST: Clear  ABD: soft, NT/ND  EXT: no edema      ASSESSMENT/PLAN:  This is a 78y o  year old female here for colonoscopy, and she is stable and optimized for her procedure

## 2021-09-03 NOTE — ANESTHESIA PREPROCEDURE EVALUATION
Procedure:  COLONOSCOPY    Relevant Problems   CARDIO   (+) Hypertension      NEURO/PSYCH   (+) Depression      PULMONARY   (+) Sleep apnea        Physical Exam    Airway    Mallampati score: I  TM Distance: >3 FB  Neck ROM: full     Dental   No notable dental hx     Cardiovascular  Rhythm: regular, Rate: normal, Cardiovascular exam normal    Pulmonary  Pulmonary exam normal Breath sounds clear to auscultation,     Other Findings        Anesthesia Plan  ASA Score- 3     Anesthesia Type- IV sedation with anesthesia with ASA Monitors  Additional Monitors:   Airway Plan:           Plan Factors-Exercise tolerance (METS): >4 METS  Chart reviewed  EKG reviewed  Imaging results reviewed  Existing labs reviewed  Patient summary reviewed  Obstructive sleep apnea risk education given perioperatively  Induction- intravenous  Postoperative Plan-     Informed Consent- Anesthetic plan and risks discussed with patient

## 2021-09-08 ENCOUNTER — OFFICE VISIT (OUTPATIENT)
Dept: OBGYN CLINIC | Facility: CLINIC | Age: 71
End: 2021-09-08
Payer: MEDICARE

## 2021-09-08 VITALS
HEIGHT: 64 IN | BODY MASS INDEX: 42.85 KG/M2 | SYSTOLIC BLOOD PRESSURE: 122 MMHG | DIASTOLIC BLOOD PRESSURE: 80 MMHG | WEIGHT: 251 LBS

## 2021-09-08 DIAGNOSIS — N95.0 PMB (POSTMENOPAUSAL BLEEDING): Primary | ICD-10-CM

## 2021-09-08 PROCEDURE — 99213 OFFICE O/P EST LOW 20 MIN: CPT | Performed by: OBSTETRICS & GYNECOLOGY

## 2021-09-08 PROCEDURE — 1123F ACP DISCUSS/DSCN MKR DOCD: CPT | Performed by: OBSTETRICS & GYNECOLOGY

## 2021-09-08 NOTE — PROGRESS NOTES
Patient presents the office discussed endometrial biopsy findings  Patient had weakly proliferative endometrium with no evidence of hyperplasia or carcinoma  I explained to the patient that her source of estrogen is most likely from her adrenal glands and converting into estrogen and acting unopposed on the uterus  I explained the mechanism and answered her questions  Impression:  Weakly proliferative endometrium without evidence of hyperplasia or carcinoma  Plan:  Observe for any bleeding, call the office immediately for repeat endometrial biopsy  Progesterone therapy also discussed    Return to office for annual exam

## 2021-09-08 NOTE — PROGRESS NOTES
The patient is here to discuss findings on endometrial biopsy on 8/17/21  The patient has no new concerns

## 2021-12-06 ENCOUNTER — NEW PATIENT (OUTPATIENT)
Dept: URBAN - METROPOLITAN AREA CLINIC 6 | Facility: CLINIC | Age: 71
End: 2021-12-06

## 2021-12-06 DIAGNOSIS — H25.13: ICD-10-CM

## 2021-12-06 DIAGNOSIS — H02.834: ICD-10-CM

## 2021-12-06 DIAGNOSIS — H02.831: ICD-10-CM

## 2021-12-06 DIAGNOSIS — H00.12: ICD-10-CM

## 2021-12-06 PROCEDURE — G8427 DOCREV CUR MEDS BY ELIG CLIN: HCPCS

## 2021-12-06 PROCEDURE — 1036F TOBACCO NON-USER: CPT

## 2021-12-06 PROCEDURE — 92002 INTRM OPH EXAM NEW PATIENT: CPT

## 2021-12-06 ASSESSMENT — VISUAL ACUITY
OS_SC: 20/40+2
OS_PH: 20/30
OD_SC: 20/30-2
OD_PH: 20/25-2

## 2021-12-06 ASSESSMENT — TONOMETRY
OD_IOP_MMHG: 19
OS_IOP_MMHG: 22

## 2021-12-29 ENCOUNTER — CLINIC PROCEDURE ONLY (OUTPATIENT)
Dept: URBAN - METROPOLITAN AREA CLINIC 6 | Facility: CLINIC | Age: 71
End: 2021-12-29

## 2021-12-29 DIAGNOSIS — H02.831: ICD-10-CM

## 2021-12-29 DIAGNOSIS — H02.834: ICD-10-CM

## 2021-12-29 DIAGNOSIS — H00.12: ICD-10-CM

## 2021-12-29 DIAGNOSIS — H25.13: ICD-10-CM

## 2021-12-29 PROCEDURE — 92012 INTRM OPH EXAM EST PATIENT: CPT | Mod: 25

## 2021-12-29 PROCEDURE — G8427 DOCREV CUR MEDS BY ELIG CLIN: HCPCS

## 2021-12-29 PROCEDURE — 1036F TOBACCO NON-USER: CPT

## 2021-12-29 PROCEDURE — 67800 REMOVE EYELID LESION: CPT

## 2021-12-29 ASSESSMENT — VISUAL ACUITY
OS_SC: 20/25
OD_SC: 20/20-1

## 2021-12-29 ASSESSMENT — TONOMETRY
OS_IOP_MMHG: 19
OD_IOP_MMHG: 21

## 2022-01-05 ENCOUNTER — PROBLEM (OUTPATIENT)
Dept: URBAN - METROPOLITAN AREA CLINIC 6 | Facility: CLINIC | Age: 72
End: 2022-01-05

## 2022-01-05 DIAGNOSIS — H02.882: ICD-10-CM

## 2022-01-05 DIAGNOSIS — H02.885: ICD-10-CM

## 2022-01-05 DIAGNOSIS — H02.831: ICD-10-CM

## 2022-01-05 DIAGNOSIS — H02.834: ICD-10-CM

## 2022-01-05 DIAGNOSIS — H25.13: ICD-10-CM

## 2022-01-05 PROCEDURE — 92012 INTRM OPH EXAM EST PATIENT: CPT

## 2022-01-05 ASSESSMENT — VISUAL ACUITY
OD_CC: 20/25
OU_SC: J3
OS_CC: 20/30

## 2022-01-05 ASSESSMENT — TONOMETRY
OS_IOP_MMHG: 16
OD_IOP_MMHG: 16

## 2022-02-03 ENCOUNTER — FOLLOW UP (OUTPATIENT)
Dept: URBAN - METROPOLITAN AREA CLINIC 6 | Facility: CLINIC | Age: 72
End: 2022-02-03

## 2022-02-03 DIAGNOSIS — H02.831: ICD-10-CM

## 2022-02-03 DIAGNOSIS — H02.834: ICD-10-CM

## 2022-02-03 DIAGNOSIS — H25.13: ICD-10-CM

## 2022-02-03 DIAGNOSIS — H02.882: ICD-10-CM

## 2022-02-03 DIAGNOSIS — H02.885: ICD-10-CM

## 2022-02-03 PROCEDURE — 92012 INTRM OPH EXAM EST PATIENT: CPT

## 2022-02-03 ASSESSMENT — VISUAL ACUITY
OD_SC: 20/30
OS_SC: 20/30

## 2022-05-04 ENCOUNTER — OFFICE VISIT (OUTPATIENT)
Dept: SLEEP CENTER | Facility: CLINIC | Age: 72
End: 2022-05-04

## 2022-05-04 VITALS
DIASTOLIC BLOOD PRESSURE: 68 MMHG | OXYGEN SATURATION: 96 % | WEIGHT: 255 LBS | SYSTOLIC BLOOD PRESSURE: 134 MMHG | BODY MASS INDEX: 43.54 KG/M2 | HEIGHT: 64 IN | HEART RATE: 84 BPM

## 2022-05-04 DIAGNOSIS — F32.A DEPRESSION, UNSPECIFIED DEPRESSION TYPE: ICD-10-CM

## 2022-05-04 DIAGNOSIS — R40.0 DAYTIME SLEEPINESS: ICD-10-CM

## 2022-05-04 DIAGNOSIS — G47.33 OSA (OBSTRUCTIVE SLEEP APNEA): Primary | ICD-10-CM

## 2022-05-04 DIAGNOSIS — I10 PRIMARY HYPERTENSION: ICD-10-CM

## 2022-05-04 DIAGNOSIS — E66.01 MORBID OBESITY (HCC): ICD-10-CM

## 2022-05-04 PROCEDURE — 99214 OFFICE O/P EST MOD 30 MIN: CPT | Performed by: INTERNAL MEDICINE

## 2022-05-04 RX ORDER — SERTRALINE HYDROCHLORIDE 25 MG/1
TABLET, FILM COATED ORAL
COMMUNITY
Start: 2022-04-08 | End: 2022-05-04

## 2022-05-04 RX ORDER — LANOLIN ALCOHOL/MO/W.PET/CERES
1000 CREAM (GRAM) TOPICAL DAILY
COMMUNITY
Start: 2022-03-08

## 2022-05-04 NOTE — PROGRESS NOTES
Follow-Up Note - [facility]   Nan Billy  73 y o  female  EQB:[birth year]  PDI:[id number]  DOS:5/4/2022    CC: I saw this patient for follow-up in clinic today for Sleep disordered breathing, Coexisting Sleep and Medical Problems  She is using a dream Station 1 machine that she got in 2015  Results of a diagnostic study at [facility] in 2015:  AHI of 18 per hour  There were also respiratory effort-related arousals resulting in an RDI of 21 per hour  Minimum oxygen saturation was 89%  There were mild periodic limb movements of sleep  She was using BiPAP at 10/6 cm H2O and declined a retitration study  PFSH, Problem List, Medications & Allergies were reviewed in EMR  Interval changes: [none reported ]   She  has a past medical history of Anxiety, Cervical herniated disc, Colon polyp, CPAP (continuous positive airway pressure) dependence, Depression, GERD (gastroesophageal reflux disease), Hyperlipidemia, Hypertension, Sleep apnea, and Syncope and collapse  She has a current medication list which includes the following prescription(s): aspirin, cholecalciferol, lisinopril, sertraline, vitamin b-12, glucosamine-chondroitin, ranitidine, and rosuvastatin  PHYSIOLOGICAL DATA REVIEW AND INTERPRETATION: [ ]  No data was available, but she reports regular use of the device for > 4 hours/night  ]; Patient has not been using ozone based devices to sanitize the machine  SUBJECTIVE: Regarding use of PAP, Bryant Crowe reports:   · She is experiencing no adverse effects: ; has [not] noticed any fibres or foreign material in air line  · She is[ ]benefiting from use: sleeping better   Sleep Routine: Bryant Crowe reports getting 8 hrs sleep[  ]; she has no difficulty initiating or maintaining sleep   She arises spontaneously and feels refreshed  Bryant Crowe reports [Excessive] [Daytime] Sleepiness, [feels drowsy [in the afternoons] and takes planned naps ] She rated [herself] at Total score: 6 /24 on the Eek Sleepiness Scale  Habits:[ reports that she quit smoking about 42 years ago  Her smoking use included cigarettes  She smoked 0 50 packs per day  She has never used smokeless tobacco ], [ reports current alcohol use of about 7 0 standard drinks of alcohol per week ], [ reports no history of drug use ], Caffeine use:limited[ ]; Exercise routine: none[ ]  ROS: reviewed & as attached  [Significant for weight has been stable  She reported no nasal, respiratory or cardiac symptoms  She feels mood is stable on Zoloft   ]     EXAM: /68   Pulse 84   Ht 5' 4" (1 626 m)   Wt 116 kg (255 lb)   LMP 01/01/2002 (Approximate)   SpO2 96%   BMI 43 77 kg/m²     Patient is well groomed; well appearing  CNS: Alert, orientated, [clear & coherent] speech  Psych: cooperative[and in no distress]  Mental state:[appears normal]  H&N: EOMI; NC/AT:[no] facial pressure marks, [no] rashes  Skin/Extrem: col & hydration [normal]; [no] edema  Resp: Respiratory effort [is normal]  [Physical findings otherwise essentially unchanged from previous ]    IMPRESSION: Problem List Items & Comorbidities Addressed this Visit    1  ИРИНА (obstructive sleep apnea)  PAP DME Resupply/Reorder   2  Daytime sleepiness     3  Primary hypertension     4  Depression, unspecified depression type     5  Morbid obesity ([address] )         PLAN:  I reviewed results of [prior studies and] physiologic data with the patient  Notified of [place] devices recall and their recommendation to discontinue use  Risks of discontinuing PAP vs continuing while awaiting resolution were explained and patient indicates understanding  I discussed treatment options with risks and benefits  Patient elected to [ ]continue using Pap while awaiting remediation  Instructed  to [employer] Patric with Paul Feliciano &] track progress on Jyotsna Tan     Care of equipment, methods to improve comfort using PAP and importance of compliance with therapy were discussed  Instructed not to use ozone based or other unapproved  cleaning devices  Pressure setting:  Continue current pressure setting  She will need to bring her machine or SD card for data download  I will only be able to place a prescription for a replacement machine once I have the pressure setting that she needs  She may need a retitration study  Rx provided to replace[ ] supplies and Care coordinated with DME provider  [Discussed] strategies for weight [reduction]  Follow-up is advised in [1 year or sooner if needed] to monitor progress, compliance and to adjust therapy  Thank you for allowing me to participate in the care of this patient  Sincerely,    Authenticated electronically by Tino Cotter MD on 72/55/29   Board Certified Specialist     Portions of the record may have been created with voice recognition software  Occasional wrong word or "sound a like" substitutions may have occurred due to the inherent limitations of voice recognition software  There may also be notations and random deletions of words or characters from malfunctioning software  Read the chart carefully and recognize, using context, where substitutions/deletions have occurred  Addendum:  I was able to obtain a old data download from her machine dating back to 2016  She has a ResMed machine set at 10/6 cm H2O  Respiratory event index was 0 9 per hour at this pressure setting

## 2022-05-04 NOTE — PROGRESS NOTES
Review of Systems      Genitourinary none   Cardiology none   Gastrointestinal frequent heartburn/acid reflux   Neurology none   Constitutional none   Integumentary none   Psychiatry anxiety   Musculoskeletal none   Pulmonary snoring   ENT none   Endocrine none   Hematological none

## 2022-05-04 NOTE — PATIENT INSTRUCTIONS
Continuous Positive Airway Pressure (CPAP) therapy was prescribed to you as a medical necessity and there are risks of discontinuing  use of the device, some of which may be long term  Symptoms you experienced before using CPAP may return such as snoring, apneas, excessive daytime sleepiness, hypertension, cardiac arrhythmias, risk of stroke, congestive heart-failure, exacerbation of COPD and potential respiratory failure  Ultimately, it is a personal decision for you to make if you continue use of an affected device or discontinue until a replacement is provided  Unfortunately, [employer] has provided us with limited information about available devices  However, recently some patients who registered the machines early on, already received replacement  You can visit their website at www  [email or web address] to register your device or www  [email or web address]  expertinquiry  com to learn more about how [place] to replace your device  You can also try calling [phone]  Another option would be to check with your medical equipment provider to determine if you are eligible for a new machine through your insurance, if not you can pay out of pocket for a new machine  According to Roxanna Montez, an in line bacterial filter is not recommended for use with CPAP/BiPAP  If you wish to get a replacement machine, we are able to provide you with a script  Please include your mask type  On 14 Jun 2021, Roxanna Montez announced a recall of most of the CPAP machines that it has made in the past decade  The recall notice stated that their concern centered on sound-deadening foam used in all the machines  What should an ordinary CPAP user do? You are being forced to make a personal decision, and you have very little hard data at your disposal   The epidemiologic studies cited above make it appear that Roxanna Montez is being overly cautious  However, you will have to live with the consequences of your decision    If you get in your car, you know that you could die in a fiery crash, or become paralyzed in a collision, but you drive on, because you understand that the odds of a bad outcome are acceptably small  Here you are being asked to make a similar decision  The chances that the foam in your CPAP machine will harm you irreparably is certainly small  Is it acceptably small? Only you can decide  The following was written by a physician CPAP user who decided for himself that there is minimal if any carcinogenic risk  On June 14 [place] issued a recall for almost all their CPAP machines in the [address], approximately 2 million devices  For the rest of the world, they did not issue a recall, but rather a "safety advisory" for the millions of other CPAP machines  Their stated reason was the "difference in regulatory environments" between the [place] and the rest of the world, but what it really means is that they are concerned with the high risk of litigation in the [address]  This is much less of an issue in the rest of the world  Because of this concern, the information from [place] to providers and patients is probably limited by their legal considerations  What did they find? Irais Lobo has two concerns  First, they found that, occasionally, in some unknown number of machines, the sound insulating foam that keeps the machine quiet can deteriorate  Tiny particles of foam could get into the airstream  Particles could, in some cases, irritate the airway and possibly cause coughing, wheezing and other allergic symptoms  This may be a more significant issue for patients with underlying lung disease, such as asthma or emphysema  As far as anyone knows, these foam breakdown particles are generally inert and similar to ordinary dust  Irais Lobo has not released any data revealing long-term risks  Is this common? They haven't told us how often this occurs; whether it's 1/100 machines, 1/1,000 machines or 1/100,0000   We believe that [place]' perspective is that the recall is necessary to protect them from litigation, even if the risk is very small (or nonexistent)  The second concern is the foam may release trace amounts of several chemicals connected to the manufacture of the foam  These chemicals are in widespread use in industry, and in fact, appear in small quantities in many consumer products, cosmetics, personal care products, and medicines  Based on studies done in the laboratory where bacteria are exposed to high concentrations of these chemicals, there is a concern that one or more may be carcinogenic at some dose  It is important to note that these screening tests do not correlate closely with human disease  At this time, there are no known human cancers associated with these substances  That does not mean they do not have the capability to contribute to cancers; we just don't know  Even if does, we don't know if it takes a month of exposure, a year, or a decade  Is it worse than smoking a pack of cigarettes for a year? Again, we just don't know  Even though the company just became aware of this issue, it has always been there, even if you've had your device for 2,3, or 4 or more years  In summary, we don't know how common foam breakdown occurs  If it does occur, we don't know how often it contributes to disease  We are not even sure that there is any meaningful risk at all  What can you do? In response to this information, patients have 4 choices, and we have had patients select each of these  We cannot give you advice, because there is not enough data to do that, but we can give you information to help you make a choice  The three choices are:    1  Keep using your CPAP until the recall is resolved, which could take up to a year, considering that the risk is unknown  Most of our patients with severe sleep apnea or who are very dependent on their machines have elected to do this   This includes most of our physician patients who are CPAP users  2  Stop using your device until the recall has been resolved  Some patients, especially those with mild to moderate sleep apnea, who may feel minimally different without their CPAPs have elected to stop using CPAP until the recall is resolved  Remember that most people have had symptoms of sleep apnea for years before the diagnosis was made  3  Purchase another brand of machine (such as Resmed) on your own with your own money  They are probably less expensive on the internet (about $900) than from your current equipment provider  If you decide on this, we can provide orders for you to use to purchase one  If you decide to stop using CPAP, you should monitor your symptoms carefully and DO NOT DRIVE IF YOU ARE SLEEPY  If you continue to use your device, keep the humidity and heat low or off  Do not use ozone , such as SoClean  Observe the tubing frequently to make sure there are no black particles or debris  If you decide to purchase a PAP device on your own on the internet, let us know and we can provided a prescription to  order a replacement machine  4  Use another form of therapy e g  Dental appliance, surgery      This has been a difficult time for all of us and we are here to help you if we can  The following was written by a physician CPAP user who decided for himself that there is minimal if any carcinogenic risk  Nursing Support:  When: Monday through Friday 7A-5PM except holidays  Where: Our direct line is [phone]  If you are having a true emergency please call 911  In the event that the line is busy or it is after hours please leave a voice message and we will return your call  Please speak clearly, leaving your full name, birth date, best number to reach you and the reason for your call  Medication refills:  We will need the name of the medication, the dosage, the ordering provider, whether you get a 30 or 90 day refill, and the pharmacy name and address  Medications will be ordered by the provider only  Nurses cannot call in prescriptions  Please allow 7 days for medication refills  Physician requested updates: If your provider requested that you call with an update after starting medication, please be ready to provide us the medication and dosage, what time you take your medication, the time you attempt to fall asleep, time you fall asleep, when you wake up, and what time you get out of bed  Sleep Study Results: We will contact you with sleep study results and/or next steps after the physician has reviewed your testing

## 2022-05-05 ENCOUNTER — TELEPHONE (OUTPATIENT)
Dept: SLEEP CENTER | Facility: CLINIC | Age: 72
End: 2022-05-05

## 2022-06-02 ENCOUNTER — FOLLOW UP (OUTPATIENT)
Dept: URBAN - METROPOLITAN AREA CLINIC 6 | Facility: CLINIC | Age: 72
End: 2022-06-02

## 2022-06-02 DIAGNOSIS — H02.831: ICD-10-CM

## 2022-06-02 DIAGNOSIS — H00.12: ICD-10-CM

## 2022-06-02 DIAGNOSIS — H02.834: ICD-10-CM

## 2022-06-02 PROCEDURE — 92012 INTRM OPH EXAM EST PATIENT: CPT

## 2022-06-02 PROCEDURE — 92285 EXTERNAL OCULAR PHOTOGRAPHY: CPT

## 2022-06-02 PROCEDURE — 92083 EXTENDED VISUAL FIELD XM: CPT

## 2022-06-02 ASSESSMENT — TONOMETRY
OD_IOP_MMHG: 18
OS_IOP_MMHG: 19

## 2022-06-02 ASSESSMENT — VISUAL ACUITY
OS_SC: 20/30
OU_SC: J3
OD_SC: 20/25-1

## 2022-07-28 LAB

## 2022-08-23 ENCOUNTER — SURGERY/PROCEDURE (OUTPATIENT)
Dept: URBAN - METROPOLITAN AREA SURGICAL CENTER 6 | Facility: SURGICAL CENTER | Age: 72
End: 2022-08-23

## 2022-08-23 DIAGNOSIS — H02.831: ICD-10-CM

## 2022-08-23 DIAGNOSIS — H02.834: ICD-10-CM

## 2022-08-23 PROCEDURE — 15823 BLEPHARP UPR EYELID XCSV SKN: CPT | Mod: 50

## 2022-08-31 ENCOUNTER — POST-OP CHECK (OUTPATIENT)
Dept: URBAN - METROPOLITAN AREA CLINIC 6 | Facility: CLINIC | Age: 72
End: 2022-08-31

## 2022-08-31 DIAGNOSIS — H02.834: ICD-10-CM

## 2022-08-31 DIAGNOSIS — H02.831: ICD-10-CM

## 2022-08-31 DIAGNOSIS — H00.12: ICD-10-CM

## 2022-08-31 DIAGNOSIS — Z98.890: ICD-10-CM

## 2022-08-31 PROCEDURE — 99024 POSTOP FOLLOW-UP VISIT: CPT

## 2022-08-31 ASSESSMENT — TONOMETRY
OS_IOP_MMHG: 21
OD_IOP_MMHG: 22

## 2022-08-31 ASSESSMENT — VISUAL ACUITY
OS_SC: 20/20-1
OD_SC: 20/20-1

## 2022-10-17 ENCOUNTER — 1 MONTH POST-OP (OUTPATIENT)
Dept: URBAN - METROPOLITAN AREA CLINIC 6 | Facility: CLINIC | Age: 72
End: 2022-10-17

## 2022-10-17 DIAGNOSIS — Z98.890: ICD-10-CM

## 2022-10-17 DIAGNOSIS — H25.13: ICD-10-CM

## 2022-10-17 PROCEDURE — 99024 POSTOP FOLLOW-UP VISIT: CPT

## 2022-10-17 ASSESSMENT — VISUAL ACUITY
OS_SC: 20/20
OD_SC: 20/20

## 2022-10-17 ASSESSMENT — TONOMETRY
OS_IOP_MMHG: 18
OD_IOP_MMHG: 20

## 2022-10-18 ENCOUNTER — EVALUATION (OUTPATIENT)
Dept: PHYSICAL THERAPY | Facility: CLINIC | Age: 72
End: 2022-10-18
Payer: MEDICARE

## 2022-10-18 DIAGNOSIS — M25.512 ACUTE PAIN OF LEFT SHOULDER DUE TO TRAUMA: Primary | ICD-10-CM

## 2022-10-18 DIAGNOSIS — G89.11 ACUTE PAIN OF LEFT SHOULDER DUE TO TRAUMA: Primary | ICD-10-CM

## 2022-10-18 PROCEDURE — 97110 THERAPEUTIC EXERCISES: CPT | Performed by: PHYSICAL THERAPIST

## 2022-10-18 PROCEDURE — 97161 PT EVAL LOW COMPLEX 20 MIN: CPT | Performed by: PHYSICAL THERAPIST
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
## 2022-10-24 ENCOUNTER — OFFICE VISIT (OUTPATIENT)
Dept: PHYSICAL THERAPY | Facility: CLINIC | Age: 72
End: 2022-10-24
Payer: MEDICARE

## 2022-10-24 DIAGNOSIS — G89.11 ACUTE PAIN OF LEFT SHOULDER DUE TO TRAUMA: Primary | ICD-10-CM

## 2022-10-24 DIAGNOSIS — M25.512 ACUTE PAIN OF LEFT SHOULDER DUE TO TRAUMA: Primary | ICD-10-CM

## 2022-10-24 PROCEDURE — 97140 MANUAL THERAPY 1/> REGIONS: CPT | Performed by: PHYSICAL THERAPIST

## 2022-10-24 PROCEDURE — 97110 THERAPEUTIC EXERCISES: CPT | Performed by: PHYSICAL THERAPIST

## 2022-10-24 PROCEDURE — 97112 NEUROMUSCULAR REEDUCATION: CPT | Performed by: PHYSICAL THERAPIST
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
## 2022-10-25 ENCOUNTER — APPOINTMENT (OUTPATIENT)
Dept: PHYSICAL THERAPY | Facility: CLINIC | Age: 72
End: 2022-10-25

## 2022-10-28 ENCOUNTER — OFFICE VISIT (OUTPATIENT)
Dept: PHYSICAL THERAPY | Facility: CLINIC | Age: 72
End: 2022-10-28
Payer: MEDICARE

## 2022-10-28 DIAGNOSIS — G89.11 ACUTE PAIN OF LEFT SHOULDER DUE TO TRAUMA: Primary | ICD-10-CM

## 2022-10-28 DIAGNOSIS — M25.512 ACUTE PAIN OF LEFT SHOULDER DUE TO TRAUMA: Primary | ICD-10-CM

## 2022-10-28 PROCEDURE — 97112 NEUROMUSCULAR REEDUCATION: CPT | Performed by: PHYSICAL THERAPIST

## 2022-10-28 PROCEDURE — 97140 MANUAL THERAPY 1/> REGIONS: CPT | Performed by: PHYSICAL THERAPIST

## 2022-10-28 PROCEDURE — 97110 THERAPEUTIC EXERCISES: CPT | Performed by: PHYSICAL THERAPIST
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
## 2022-10-31 ENCOUNTER — OFFICE VISIT (OUTPATIENT)
Dept: PHYSICAL THERAPY | Facility: CLINIC | Age: 72
End: 2022-10-31

## 2022-10-31 DIAGNOSIS — G89.11 ACUTE PAIN OF LEFT SHOULDER DUE TO TRAUMA: Primary | ICD-10-CM

## 2022-10-31 DIAGNOSIS — M25.512 ACUTE PAIN OF LEFT SHOULDER DUE TO TRAUMA: Primary | ICD-10-CM
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
## 2022-11-04 ENCOUNTER — OFFICE VISIT (OUTPATIENT)
Dept: PHYSICAL THERAPY | Facility: CLINIC | Age: 72
End: 2022-11-04

## 2022-11-04 DIAGNOSIS — G89.11 ACUTE PAIN OF LEFT SHOULDER DUE TO TRAUMA: Primary | ICD-10-CM

## 2022-11-04 DIAGNOSIS — M25.512 ACUTE PAIN OF LEFT SHOULDER DUE TO TRAUMA: Primary | ICD-10-CM
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
## 2022-11-07 ENCOUNTER — OFFICE VISIT (OUTPATIENT)
Dept: PHYSICAL THERAPY | Facility: CLINIC | Age: 72
End: 2022-11-07

## 2022-11-07 DIAGNOSIS — G89.11 ACUTE PAIN OF LEFT SHOULDER DUE TO TRAUMA: Primary | ICD-10-CM

## 2022-11-07 DIAGNOSIS — M25.512 ACUTE PAIN OF LEFT SHOULDER DUE TO TRAUMA: Primary | ICD-10-CM
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
## 2022-11-11 ENCOUNTER — APPOINTMENT (OUTPATIENT)
Dept: PHYSICAL THERAPY | Facility: CLINIC | Age: 72
End: 2022-11-11

## 2022-11-14 ENCOUNTER — EVALUATION (OUTPATIENT)
Dept: PHYSICAL THERAPY | Facility: CLINIC | Age: 72
End: 2022-11-14

## 2022-11-14 DIAGNOSIS — M25.512 ACUTE PAIN OF LEFT SHOULDER DUE TO TRAUMA: Primary | ICD-10-CM

## 2022-11-14 DIAGNOSIS — G89.11 ACUTE PAIN OF LEFT SHOULDER DUE TO TRAUMA: Primary | ICD-10-CM
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
## 2022-11-16 ENCOUNTER — HOSPITAL ENCOUNTER (OUTPATIENT)
Dept: RADIOLOGY | Facility: HOSPITAL | Age: 72
Discharge: HOME | End: 2022-11-16
Attending: ORTHOPAEDIC SURGERY

## 2022-11-16 ENCOUNTER — OFFICE VISIT (OUTPATIENT)
Dept: OBGYN CLINIC | Facility: CLINIC | Age: 72
End: 2022-11-16

## 2022-11-16 VITALS
DIASTOLIC BLOOD PRESSURE: 90 MMHG | HEIGHT: 64 IN | WEIGHT: 251.8 LBS | BODY MASS INDEX: 42.99 KG/M2 | HEART RATE: 75 BPM | SYSTOLIC BLOOD PRESSURE: 148 MMHG

## 2022-11-16 DIAGNOSIS — G89.29 CHRONIC LEFT SHOULDER PAIN: ICD-10-CM

## 2022-11-16 DIAGNOSIS — M75.52 SUBACROMIAL BURSITIS OF LEFT SHOULDER JOINT: Primary | ICD-10-CM

## 2022-11-16 DIAGNOSIS — M25.512 CHRONIC LEFT SHOULDER PAIN: ICD-10-CM

## 2022-11-16 DIAGNOSIS — M75.82 TENDINITIS OF LEFT ROTATOR CUFF: ICD-10-CM

## 2022-11-16 DIAGNOSIS — M25.512 LEFT SHOULDER PAIN, UNSPECIFIED CHRONICITY: ICD-10-CM

## 2022-11-16 RX ORDER — TRIAMCINOLONE ACETONIDE 40 MG/ML
40 INJECTION, SUSPENSION INTRA-ARTICULAR; INTRAMUSCULAR
Status: COMPLETED | OUTPATIENT
Start: 2022-11-16 | End: 2022-11-16

## 2022-11-16 RX ORDER — BUPIVACAINE HYDROCHLORIDE 2.5 MG/ML
4 INJECTION, SOLUTION INFILTRATION; PERINEURAL
Status: COMPLETED | OUTPATIENT
Start: 2022-11-16 | End: 2022-11-16

## 2022-11-16 RX ADMIN — BUPIVACAINE HYDROCHLORIDE 4 ML: 2.5 INJECTION, SOLUTION INFILTRATION; PERINEURAL at 14:33

## 2022-11-16 RX ADMIN — TRIAMCINOLONE ACETONIDE 40 MG: 40 INJECTION, SUSPENSION INTRA-ARTICULAR; INTRAMUSCULAR at 14:33
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
## 2022-11-18 ENCOUNTER — APPOINTMENT (OUTPATIENT)
Dept: PHYSICAL THERAPY | Facility: CLINIC | Age: 72
End: 2022-11-18

## 2022-11-21 ENCOUNTER — APPOINTMENT (OUTPATIENT)
Dept: PHYSICAL THERAPY | Facility: CLINIC | Age: 72
End: 2022-11-21

## 2022-11-25 ENCOUNTER — APPOINTMENT (OUTPATIENT)
Dept: PHYSICAL THERAPY | Facility: CLINIC | Age: 72
End: 2022-11-25

## 2022-11-28 ENCOUNTER — APPOINTMENT (OUTPATIENT)
Dept: PHYSICAL THERAPY | Facility: CLINIC | Age: 72
End: 2022-11-28

## 2022-11-29 ENCOUNTER — OFFICE VISIT (OUTPATIENT)
Dept: GYNECOLOGY | Facility: CLINIC | Age: 72
End: 2022-11-29

## 2022-11-29 DIAGNOSIS — N95.0 PMB (POSTMENOPAUSAL BLEEDING): Primary | ICD-10-CM

## 2022-11-29 NOTE — PROGRESS NOTES
Endometrial biopsy    Date/Time: 11/29/2022 3:53 PM  Performed by: Katia Roldan MD  Authorized by: Katia Roldan MD   Universal Protocol:  Consent: Verbal consent obtained  Risks and benefits: risks, benefits and alternatives were discussed  Consent given by: patient  Timeout called at: 11/29/2022 3:53 PM   Patient understanding: patient states understanding of the procedure being performed  Patient consent: the patient's understanding of the procedure matches consent given  Patient identity confirmed: verbally with patient      Indication:     Indications: Post-menopausal bleeding      Chronicity of post-menopausal bleeding:  New    Progression of post-menopausal bleeding:  Unchanged  Procedure:     Procedure: endometrial biopsy with Pipelle      A bivalve speculum was placed in the vagina: yes      Cervix cleaned and prepped: yes      A paracervical block was performed: no      An intracervical block was performed: no      The cervix was dilated: no      Uterus sounded: yes      Uterus sound depth (cm):  7    Specimen collected: specimen collected and sent to pathology      Patient tolerated procedure well with no complications: yes    Findings:     Uterus size:  9-10 weeks    Cervix: normal      Adnexa: normal    Comments:     Procedure comments:  Patient returns the office for endometrial biopsy  Patient was seen last year for postmenopausal bleeding and had an endometrial biopsy performed and the office following an ultrasound August 2021  Pathology showed inactive to weakly proliferative endometrium  No hyperplasia was noted  She was instructed that if she developed bleeding again to call the office for biopsy  Impression:  Postmenopausal bleeding  Plan:  Check biopsy  If still showing weakly proliferative endometrium, discussed with patient treating with progesterone 10 days each month  for 6 months and rebiopsy

## 2022-12-05 LAB — BIOPSY SPEC-IMP: NORMAL

## 2022-12-09 ENCOUNTER — TELEPHONE (OUTPATIENT)
Dept: OBGYN CLINIC | Facility: HOSPITAL | Age: 72
End: 2022-12-09

## 2022-12-09 NOTE — TELEPHONE ENCOUNTER
Caller: Christiana Huffman     Doctor: Shaun Costa     Reason for call: Patient is still having pain in her left shoulder   She is wondering why it is not better after the injection    Call back#: [phone]

## 2023-01-04 ENCOUNTER — OFFICE VISIT (OUTPATIENT)
Dept: OBGYN CLINIC | Facility: CLINIC | Age: 73
End: 2023-01-04

## 2023-01-04 VITALS
BODY MASS INDEX: 42.3 KG/M2 | HEART RATE: 77 BPM | WEIGHT: 247.8 LBS | DIASTOLIC BLOOD PRESSURE: 94 MMHG | SYSTOLIC BLOOD PRESSURE: 150 MMHG | HEIGHT: 64 IN

## 2023-01-04 DIAGNOSIS — M75.52 SUBACROMIAL BURSITIS OF LEFT SHOULDER JOINT: Primary | ICD-10-CM

## 2023-01-04 DIAGNOSIS — M75.82 TENDINITIS OF LEFT ROTATOR CUFF: ICD-10-CM
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
## 2023-01-19 ENCOUNTER — HOSPITAL ENCOUNTER (OUTPATIENT)
Dept: MRI IMAGING | Facility: HOSPITAL | Age: 73
Discharge: HOME | End: 2023-01-19

## 2023-01-19 DIAGNOSIS — M75.82 TENDINITIS OF LEFT ROTATOR CUFF: ICD-10-CM

## 2023-01-19 DIAGNOSIS — M75.52 SUBACROMIAL BURSITIS OF LEFT SHOULDER JOINT: ICD-10-CM

## 2023-01-25 ENCOUNTER — OFFICE VISIT (OUTPATIENT)
Dept: OBGYN CLINIC | Facility: CLINIC | Age: 73
End: 2023-01-25

## 2023-01-25 ENCOUNTER — TELEPHONE (OUTPATIENT)
Dept: OBGYN CLINIC | Facility: CLINIC | Age: 73
End: 2023-01-25

## 2023-01-25 VITALS
HEIGHT: 64 IN | SYSTOLIC BLOOD PRESSURE: 166 MMHG | WEIGHT: 247 LBS | OXYGEN SATURATION: 96 % | HEART RATE: 73 BPM | DIASTOLIC BLOOD PRESSURE: 105 MMHG | BODY MASS INDEX: 42.17 KG/M2

## 2023-01-25 DIAGNOSIS — M75.32 CALCIFIC TENDONITIS OF LEFT SHOULDER: Primary | ICD-10-CM
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
## 2023-01-25 NOTE — TELEPHONE ENCOUNTER
Dr Yong Morgan Ask is referring this patient Mendoza Garcias to Dr Brennan Zhang  For a US guided lavage of the left shoulder for calcific tendonitis  Can you reach out to her to get her on the schedule? Her  is 1950  Her contact number is [phone]  Thank you

## 2023-01-25 NOTE — TELEPHONE ENCOUNTER
Left message for patient to call office back regarding scheduling a Tenex consult appointment with Dr Roshan Snyder for her left shoulder

## 2023-01-31 ENCOUNTER — OFFICE VISIT (OUTPATIENT)
Dept: OBGYN CLINIC | Facility: OTHER | Age: 73
End: 2023-01-31

## 2023-01-31 VITALS
HEART RATE: 74 BPM | BODY MASS INDEX: 42.24 KG/M2 | HEIGHT: 64 IN | SYSTOLIC BLOOD PRESSURE: 149 MMHG | WEIGHT: 247.4 LBS | DIASTOLIC BLOOD PRESSURE: 85 MMHG

## 2023-01-31 DIAGNOSIS — G89.29 CHRONIC LEFT SHOULDER PAIN: ICD-10-CM

## 2023-01-31 DIAGNOSIS — M75.32 CALCIFIC TENDONITIS OF LEFT SHOULDER: ICD-10-CM

## 2023-01-31 DIAGNOSIS — M25.512 CHRONIC LEFT SHOULDER PAIN: ICD-10-CM

## 2023-01-31 DIAGNOSIS — M67.912 TENDINOPATHY OF LEFT ROTATOR CUFF: Primary | ICD-10-CM

## 2023-01-31 RX ORDER — SERTRALINE HYDROCHLORIDE 25 MG/1
TABLET, FILM COATED ORAL
COMMUNITY
Start: 2023-01-13

## 2023-01-31 RX ORDER — FAMOTIDINE 40 MG/1
40 TABLET, FILM COATED ORAL 2 TIMES DAILY
COMMUNITY
Start: 2022-12-11
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
## 2023-02-15 ENCOUNTER — TELEPHONE (OUTPATIENT)
Dept: OBGYN CLINIC | Facility: OTHER | Age: 73
End: 2023-02-15

## 2023-02-15 NOTE — TELEPHONE ENCOUNTER
Left message for patient  Patient was seen by Dr Parth Forbes for a Tenex consult  However, Tenex procedure was denied by her insurance  Called patient to discuss scheduling her for a [place] procedure, which is done in the office instead  Advised to call office back to discuss this

## 2023-02-16 ENCOUNTER — OFFICE VISIT (OUTPATIENT)
Dept: GASTROENTEROLOGY | Facility: CLINIC | Age: 73
End: 2023-02-16

## 2023-02-16 VITALS
WEIGHT: 249.8 LBS | DIASTOLIC BLOOD PRESSURE: 87 MMHG | SYSTOLIC BLOOD PRESSURE: 152 MMHG | HEART RATE: 76 BPM | HEIGHT: 64 IN | BODY MASS INDEX: 42.65 KG/M2

## 2023-02-16 DIAGNOSIS — K21.9 GASTROESOPHAGEAL REFLUX DISEASE WITHOUT ESOPHAGITIS: ICD-10-CM

## 2023-02-16 DIAGNOSIS — K57.90 DIVERTICULAR DISEASE: ICD-10-CM

## 2023-02-16 DIAGNOSIS — K76.0 FATTY LIVER: Primary | ICD-10-CM

## 2023-02-16 DIAGNOSIS — Z86.010 HISTORY OF COLON POLYPS: ICD-10-CM

## 2023-02-16 DIAGNOSIS — R16.0 HEPATOMEGALY: ICD-10-CM

## 2023-02-16 DIAGNOSIS — Z11.59 ENCOUNTER FOR SCREENING FOR OTHER VIRAL DISEASES: ICD-10-CM

## 2023-02-16 NOTE — TELEPHONE ENCOUNTER
Caller: Patient    Doctor: Estrella Rose    Reason for call: Patient returning you call      Call back#: [phone]

## 2023-02-16 NOTE — PROGRESS NOTES
[facility] - Outpatient Follow-up Note  Tang Hernandez 81 y o  female MRN: [id number]  Encounter: [id number]          ASSESSMENT AND PLAN:      1  Fatty liver  -     HOMER Screen w/ Reflex to Titer/Pattern; Future  -     Chronic Hepatitis Panel; Future  -     Hepatic function panel; Future    2  Hepatomegaly  -     HOMER Screen w/ Reflex to Titer/Pattern; Future  -     Chronic Hepatitis Panel; Future  -     Hepatic function panel; Future    3  History of colon polyps    4  Diverticular disease    5  Gastroesophageal reflux disease without esophagitis    6  BMI 40 0-44 9, adult ([address] )    7  Encounter for screening for other viral diseases  -     Chronic Hepatitis Panel; Future      Incidental finding of hepatomegaly 20 cm size and fatty liver, her main risk factors being high BMI and hype lipidemia, consequences of fatty liver including fibrosis and advanced liver disease discussed  Her LFTs and platelets are normal   Encourage her to lose weight which may help her fatty liver as well as hyperlipidemia  Encouraged her also to be more mobile  We will check for hepatitis B C serologies HOMER to ensure there is no other associated liver disease  She will call us after above work-up, otherwise may follow-up with us in 1 year or as needed  ______________________________________________________________________    SUBJECTIVE:     Patient had a CT of the abdomen done, found to have hepatomegaly and fatty liver referred for further evaluation  Patient denies any known history of liver disease hepatitis jaundice, does not bleed or bruise easily, social 1 drink  No family history of advanced liver disease and/or liver cancer  She may have gained some weight, not very active, denies dysphagia upper GI symptoms, bowels generally regular no apparent blood  No high risk behavior, diet medications more than 10 pertinent systems reviewed  REVIEW OF SYSTEMS IS OTHERWISE NEGATIVE        Historical Information   Past Medical History:   Diagnosis Date   • Anxiety    • Cervical herniated disc     9/3/21  good ROM-getting PT   • Colon polyp    • CPAP (continuous positive airway pressure) dependence    • Depression    • Fatty liver    • GERD (gastroesophageal reflux disease)    • Hyperlipidemia    • Hypertension    • Sleep apnea    • Syncope and collapse     9/3/21  episode of passing out 3-4 weeks ago, awoke quickly; injured tailbone, did not go to ER       Past Surgical History:   Procedure Laterality Date   • COLONOSCOPY      polyp   • COLONOSCOPY     • CYSTOSCOPY     • [place] HYSTEROSCOPY BX ENDOMETRIUM&/POLYPC W/WO D&C N/A 2016    Procedure: HYSTEROSCOPY; DILATAION & CURETTAGE; POLYPECTOMY ;  Surgeon: Lino Grigsby MD;  [facility];  Service: Gynecology   • UPPER GASTROINTESTINAL ENDOSCOPY       Social History   Social History     Substance and Sexual Activity   Alcohol Use Yes   • Alcohol/week: 7 0 standard drinks   • Types: 7 Standard drinks or equivalent per week    Comment: cocktail nightly-1     Social History     Substance and Sexual Activity   Drug Use Never     Social History     Tobacco Use   Smoking Status Former   • Packs/day: 0 50   • Types: Cigarettes   • Quit date:    • Years since quittin 1   Smokeless Tobacco Never     Family History   Problem Relation Age of Onset   • Ulcerative colitis Mother    • COPD Mother    • Heart disease Father        Meds/Allergies       Current Outpatient Medications:   •  Cholecalciferol 50 MCG ( UT) CAPS  •  famotidine (PEPCID) 40 MG tablet  •  glucosamine-chondroitin 500-400 MG tablet  •  lisinopril (ZESTRIL) 30 mg tablet  •  rosuvastatin (CRESTOR) 5 mg tablet  •  sertraline (ZOLOFT) 100 mg tablet  •  sertraline (ZOLOFT) 25 mg tablet  •  vitamin B-12 (VITAMIN B-12) 1,000 mcg tablet  •  aspirin (ECOTRIN LOW STRENGTH) 81 mg EC tablet  •  ranitidine (ZANTAC) 150 mg tablet    Allergies   Allergen Reactions   • Prevacid [Lansoprazole] Rash Objective     Blood pressure 152/87, pulse 76, height 5' 4" (1 626 m), weight 113 kg (249 lb 12 8 oz), last menstrual period 01/01/2002  Body mass index is 42 88 kg/m²  PHYSICAL EXAM:      General Appearance:   Alert, cooperative, no distress   HEENT:   Normocephalic, atraumatic, anicteric  Neck:  Supple, symmetrical, trachea midline   Lungs:   Clear to auscultation bilaterally; no rales, rhonchi or wheezing; respirations unlabored    Heart[de-identified]   Regular rate and rhythm; no murmur  Abdomen:   Soft, non-tender, non-distended; normal bowel sounds; no masses, no organomegaly    Genitalia:   Deferred    Rectal:   Deferred    Extremities:  No cyanosis, clubbing or edema    Skin:  No jaundice, rashes, or lesions    Lymph nodes:  No palpable cervical lymphadenopathy        Lab Results:   No visits with results within 1 Day(s) from this visit  Latest known visit with results is:   Office Visit on 11/29/2022   Component Date Value   • ENDOMETRIAL BIOPSY 11/30/2022 BENIGN          Radiology Results:   MRI shoulder left wo contrast    Result Date: 1/21/2023  Narrative: MRI LEFT SHOULDER INDICATION:   M75 52: Bursitis of left shoulder M75 82: Other shoulder lesions, left shoulder  COMPARISON:  11/16/2022 TECHNIQUE:   Multiplanar/multisequence MR of the left shoulder was performed  FINDINGS: SUBCUTANEOUS TISSUES: Normal JOINT EFFUSION: None  ACROMION PROCESS: Normal  ROTATOR CUFF: In conjunction with prior radiographs, there appears to be a 1 cm focus of hydroxyapatite deposition within the supraspinatus tendon  There is mild supraspinatus tendinosis with low-grade interstitial tearing  Mild subscapularis and infraspinatus tendinosis  No high-grade rotator cuff tendon tears  SUBACROMIAL/SUBDELTOID BURSA: There is mild subacromial/subdeltoid bursitis  LONG HEAD OF BICEPS TENDON: Mild tenosynovitis  GLENOID LABRUM: Diffuse labral fraying  GLENOHUMERAL JOINT: Mild osteoarthritis   ACROMIOCLAVICULAR JOINT: There is mild osteoarthritis  BONES: No acute fracture or aggressive osseous lesion  Impression: Apparent hydroxyapatite deposition within the supraspinatus tendon with mild subcutaneous tendinosis  This is poorly characterized by MRI, however, in conjunction with the radiographic appearance, findings are suggestive for calcific tendinitis  This may be amenable to ultrasound-guided lavage  Mild infraspinatus and subscapularis tendinosis  Mild long head biceps tenosynovitis  No high-grade rotator cuff tendon tears  Additional findings, as described   Workstation performed: [id number]

## 2023-02-17 NOTE — TELEPHONE ENCOUNTER
Spoke to patient  Patient is scheduled to see Dr Breanne Casey on 03/20 for a consult for Elias and an appointment for the procedure on 03/28 scheduled since Dr Marvin Singleton does not have availability until mid to end of April due to him being out of the office

## 2023-03-17 LAB — HCV AB SER-ACNC: NEGATIVE

## 2023-03-20 ENCOUNTER — APPOINTMENT (OUTPATIENT)
Dept: RADIOLOGY | Facility: AMBULARY SURGERY CENTER | Age: 73
End: 2023-03-20

## 2023-03-20 ENCOUNTER — OFFICE VISIT (OUTPATIENT)
Dept: OBGYN CLINIC | Facility: CLINIC | Age: 73
End: 2023-03-20

## 2023-03-20 VITALS — HEART RATE: 71 BPM | DIASTOLIC BLOOD PRESSURE: 79 MMHG | SYSTOLIC BLOOD PRESSURE: 148 MMHG

## 2023-03-20 DIAGNOSIS — M67.912 TENDINOPATHY OF LEFT ROTATOR CUFF: Primary | ICD-10-CM

## 2023-03-20 DIAGNOSIS — M75.32 CALCIFIC TENDONITIS OF LEFT SHOULDER: ICD-10-CM
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
## 2023-03-29 ENCOUNTER — TELEPHONE (OUTPATIENT)
Dept: OBGYN CLINIC | Facility: HOSPITAL | Age: 73
End: 2023-03-29

## 2023-03-29 NOTE — TELEPHONE ENCOUNTER
Caller: Amparo from [facility]    Doctor/Office: Jamar    #:  option 2      What needs to be faxed: Malgorzata Varela for 3/20/23    ATTN to: Amparo    Fax#: [phone]      Documents were successfully e-faxed

## 2023-03-29 NOTE — TELEPHONE ENCOUNTER
Caller: [facility]    Doctor: Gilles Pérez    Reason for call: Calling for previously requested office note from 3/20/23  It was faxed 2 times prior but not going through  Can we print out and fax?       Fax#:  [phone]    Call back#: [phone] option 2

## 2023-03-29 NOTE — TELEPHONE ENCOUNTER
Caller: [facility]     Doctor/Office: Erica    What needs to be faxed: office note     ATTN to: PT     Fax#: [phone]     Documents were successfully e-faxed

## 2023-05-18 ENCOUNTER — OFFICE VISIT (OUTPATIENT)
Dept: SLEEP CENTER | Facility: CLINIC | Age: 73
End: 2023-05-18

## 2023-05-18 VITALS
DIASTOLIC BLOOD PRESSURE: 80 MMHG | HEIGHT: 64 IN | OXYGEN SATURATION: 97 % | BODY MASS INDEX: 43.02 KG/M2 | SYSTOLIC BLOOD PRESSURE: 140 MMHG | HEART RATE: 73 BPM | WEIGHT: 252 LBS

## 2023-05-18 DIAGNOSIS — R40.0 DAYTIME SLEEPINESS: ICD-10-CM

## 2023-05-18 DIAGNOSIS — K21.9 GASTROESOPHAGEAL REFLUX DISEASE, UNSPECIFIED WHETHER ESOPHAGITIS PRESENT: ICD-10-CM

## 2023-05-18 DIAGNOSIS — F32.A DEPRESSION, UNSPECIFIED DEPRESSION TYPE: ICD-10-CM

## 2023-05-18 DIAGNOSIS — I10 PRIMARY HYPERTENSION: ICD-10-CM

## 2023-05-18 DIAGNOSIS — E66.01 MORBID OBESITY (HCC): ICD-10-CM

## 2023-05-18 DIAGNOSIS — G47.33 OSA (OBSTRUCTIVE SLEEP APNEA): Primary | ICD-10-CM

## 2023-05-18 NOTE — PROGRESS NOTES
Follow-Up Note - [facility]   Daryl Harrington  81 y o  female  YYA:[id number]  UIA:[id number]  DOS:5/18/2023    CC: I saw this patient for follow-up in clinic today for Sleep disordered breathing, Coexisting Sleep and Medical Problems  Previously stated she is using a DreamStation 1 machine but today states she is using a ResMed that she got is a replacement through her insurance  Interval changes: None reported  Results of a diagnostic study at [facility] in 2015:  AHI of 18 per hour  There were also respiratory effort-related arousals resulting in an RDI of 21 per hour  Minimum oxygen saturation was 89%  There were mild periodic limb movements of sleep  She was using BiPAP at 10/6 cm H2O and declined a retitration study  PFSH, Problem List, Medications & Allergies were reviewed in EMR  She  has a past medical history of Anxiety, Cervical herniated disc, Colon polyp, CPAP (continuous positive airway pressure) dependence, Depression, Fatty liver, GERD (gastroesophageal reflux disease), Hyperlipidemia, Hypertension, Sleep apnea, and Syncope and collapse  She has a current medication list which includes the following prescription(s): cholecalciferol, famotidine, glucosamine-chondroitin, lisinopril, rosuvastatin, sertraline, sertraline, vitamin b-12, aspirin, and ranitidine  PHYSIOLOGICAL DATA REVIEW : No data was available, but she reports regular use of the device for > 4 hours/night  ;  Patient has not been using non FDA approved devices to sanitize the machine  INTERPRETATION: Compliance is ?; Pressure setting is:?; ;   SUBJECTIVE: With respect to use of PAP, Patrick Xiao  is experiencing some adverse effects:mask causes redness / facial marks   She derives benefit  Is satisfied with sleep and daytime function  Sleep Routine: Patrick Zunigaccasin reports getting 7-8 hrs sleep; she has no difficulty initiating or maintaining sleep   She arises spontaneously and usually feels refreshed  Patrick Xiao reports "excessive daytime sleepiness, [feels like napping & does when has the opportunity ] She rated [herself] at Total score: 8 /24 on the [place] Sleepiness Scale  Other issues: None reported  Habits:[ reports that she quit smoking about 43 years ago  Her smoking use included cigarettes  She smoked an average of  5 packs per day  She has never used smokeless tobacco ], [ reports current alcohol use of about 7 0 standard drinks per week ], [ reports no history of drug use ], Caffeine use:limited; Exercise routine: none  ROS: Significant for few pounds weight gain  Acid reflux is controlled mood is stable on sertraline  A 10 point review of systems was otherwise negative  Clau Hy EXAM: /80   Pulse 73   Ht 5' 4\" (1 626 m)   Wt 114 kg (252 lb)   LMP 01/01/2002 (Approximate)   SpO2 97%   BMI 43 26 kg/m²     Wt Readings from Last 3 Encounters:   05/18/23 114 kg (252 lb)   02/16/23 113 kg (249 lb 12 8 oz)   01/31/23 112 kg (247 lb 6 4 oz)      Patient is well groomed; well appearing  CNS: Alert, orientated, clear & coherent speech  Psych: cooperative and in no distress  Mental state: Appears normal   H&N: EOMI; NC/AT: No facial pressure marks, no rashes  Skin/Extrem: col & hydration normal; no edema  Resp: Respiratory effort is normal  Physical findings otherwise essentially unchanged from previous  Last CMP demonstrated normal CO2, elevated fasting glucose but otherwise unremarkable    IMPRESSION: Problem List Items & Comorbidities Addressed this Visit    1  ИРИНА (obstructive sleep apnea)  PAP DME Resupply/Reorder      2  Daytime sleepiness        3  Primary hypertension        4  Depression, unspecified depression type        5  Morbid obesity ([address] )        6  Gastroesophageal reflux disease, unspecified whether esophagitis present            PLAN:  1  I reviewed results of prior studies and physiologic data with the patient  2  I discussed treatment options with risks and benefits    3  Treatment " "with  PAP is medically necessary and Anh Anderson is agreable to continue use  4  Care of equipment, methods to improve comfort using PAP and importance of compliance with therapy were discussed  5  Pressure setting: continue 10/6 cmH2O  She was instructed to bring in her machine or SD card for data download to facilitate any pressure adjustments  6  Rx provided to replace supplies and Care coordinated with DME provider  7  Discussed strategies for weight reduction  8  Follow-up is advised in 1 year or sooner if needed to monitor progress, compliance and to adjust therapy  Thank you for allowing me to participate in the care of this patient  Sincerely,     Authenticated electronically on 43/53/13   Board Certified Specialist     Portions of the record may have been created with voice recognition software  Occasional wrong word or \"sound a like\" substitutions may have occurred due to the inherent limitations of voice recognition software  There may also be notations and random deletions of words or characters from malfunctioning software  Read the chart carefully and recognize, using context, where substitutions/deletions have occurred      "

## 2023-05-18 NOTE — PATIENT INSTRUCTIONS

## 2023-05-19 ENCOUNTER — TELEPHONE (OUTPATIENT)
Dept: SLEEP CENTER | Facility: CLINIC | Age: 73
End: 2023-05-19

## 2023-05-22 LAB

## 2023-06-06 ENCOUNTER — HOSPITAL ENCOUNTER (OUTPATIENT)
Dept: RADIOLOGY | Facility: HOSPITAL | Age: 73
Discharge: HOME | End: 2023-06-06
Payer: MEDICARE

## 2023-06-06 DIAGNOSIS — R92.2 DENSE BREASTS: ICD-10-CM

## 2023-06-06 PROCEDURE — 76641 ULTRASOUND BREAST COMPLETE: CPT

## 2023-06-12 ENCOUNTER — HOSPITAL ENCOUNTER (OUTPATIENT)
Dept: RADIOLOGY | Facility: HOSPITAL | Age: 73
Discharge: HOME | End: 2023-06-12

## 2023-09-28 ENCOUNTER — ANNUAL EXAM (OUTPATIENT)
Dept: GYNECOLOGY | Facility: CLINIC | Age: 73
End: 2023-09-28
Payer: MEDICARE

## 2023-09-28 VITALS
HEIGHT: 64 IN | WEIGHT: 256 LBS | SYSTOLIC BLOOD PRESSURE: 130 MMHG | BODY MASS INDEX: 43.71 KG/M2 | DIASTOLIC BLOOD PRESSURE: 80 MMHG

## 2023-09-28 DIAGNOSIS — R93.89 THICKENED ENDOMETRIUM: ICD-10-CM

## 2023-09-28 DIAGNOSIS — Z12.31 SCREENING MAMMOGRAM FOR BREAST CANCER: Primary | ICD-10-CM

## 2023-09-28 DIAGNOSIS — N95.2 VAGINAL ATROPHY: ICD-10-CM

## 2023-09-28 PROCEDURE — G0101 CA SCREEN;PELVIC/BREAST EXAM: HCPCS | Performed by: OBSTETRICS & GYNECOLOGY

## 2023-09-28 NOTE — PROGRESS NOTES
Assessment/Plan:    Normal breast exam  Vaginal atrophy  Thickened endometrium  Normal mammogram March 2023  Colonoscopy with polyp September 2021. Diverticulosis  Status post endometrial biopsies August 2021 and November 2022    Impression: Thickened endometrial lining on pelvic ultrasound. Plan: Rx mammogram.  Consent for diagnostic hysteroscopy D&C signed today. We will schedule. Needs preop clearance. Subjective:      Patient ID: Lisa Muniz is a 81 y.o. female presents for annual exam complaining of left lower quadrant pain. Patient was seen in the emergency room and evaluated. No active diverticulitis was diagnosed but she does have a history of diverticulosis. Her ultrasound showed the uterus to be 9.9 x 4.9 x 6.5 cm with a 1 cm fibroid. Endometrial stripe was thickened 1.4 cm. Ovaries were not visualized. She had 2 prior endometrial biopsies postmenopausal bleeding. Both biopsies were benign. Denies any vaginal bleeding. Presently not sexually active. Denies any breast or bladder issues. Medications reviewed. Objective:      /80   Ht 5' 4" (1.626 m)   Wt 116 kg (256 lb)   LMP 01/01/2002 (Approximate)   BMI 43.94 kg/m²          Physical Exam  Constitutional:       Appearance: She is well-developed. She is obese. HENT:      Head: Normocephalic and atraumatic. Neck:      Thyroid: No thyromegaly. Trachea: No tracheal deviation. Cardiovascular:      Rate and Rhythm: Normal rate and regular rhythm. Heart sounds: Normal heart sounds. Pulmonary:      Effort: Pulmonary effort is normal. No respiratory distress. Breath sounds: Normal breath sounds. No stridor. No wheezing or rales. Chest:      Chest wall: No tenderness. Breasts:     Breasts are symmetrical.      Right: No inverted nipple, mass, nipple discharge, skin change or tenderness. Left: No inverted nipple, mass, nipple discharge, skin change or tenderness.    Abdominal:      General: Bowel sounds are normal. There is no distension. Palpations: Abdomen is soft. There is no mass. Tenderness: There is no abdominal tenderness. There is no guarding or rebound. Hernia: No hernia is present. There is no hernia in the left inguinal area. Genitourinary:     Labia:         Right: No rash, tenderness, lesion or injury. Left: No rash, tenderness, lesion or injury. Vagina: Normal. No signs of injury and foreign body. No vaginal discharge, erythema, tenderness or bleeding. Cervix: No cervical motion tenderness, discharge or friability. Uterus: Not deviated, not enlarged, not fixed and not tender. Adnexa:         Right: No mass, tenderness or fullness. Left: No mass, tenderness or fullness. Rectum: No mass, anal fissure, external hemorrhoid or internal hemorrhoid. Comments: Mild vaginal atrophy. Normal urethra and [employer]'s glands. No lesions or bleeding. Uterus normal size mobile nontender. Adnexa not appreciated secondary to body habitus. Musculoskeletal:      Cervical back: Normal range of motion and neck supple. Lymphadenopathy:      Lower Body: No right inguinal adenopathy. No left inguinal adenopathy. Skin:     General: Skin is warm and dry. Neurological:      Mental Status: She is alert and oriented to person, place, and time. Psychiatric:         Behavior: Behavior normal.         Thought Content:  Thought content normal.         Judgment: Judgment normal.

## 2023-10-13 ENCOUNTER — APPOINTMENT (OUTPATIENT)
Dept: LAB | Facility: CLINIC | Age: 73
End: 2023-10-13
Payer: MEDICARE

## 2023-10-13 DIAGNOSIS — R93.89 THICKENED ENDOMETRIUM: ICD-10-CM

## 2023-10-13 LAB
ALBUMIN SERPL BCP-MCNC: 4.2 G/DL (ref 3.5–5)
ALP SERPL-CCNC: 61 U/L (ref 34–104)
ALT SERPL W P-5'-P-CCNC: 14 U/L (ref 7–52)
ANION GAP SERPL CALCULATED.3IONS-SCNC: 3 MMOL/L
AST SERPL W P-5'-P-CCNC: 17 U/L (ref 13–39)
BASOPHILS # BLD AUTO: 0.05 THOUSANDS/ÂΜL (ref 0–0.1)
BASOPHILS NFR BLD AUTO: 1 % (ref 0–1)
BILIRUB SERPL-MCNC: 0.47 MG/DL (ref 0.2–1)
BUN SERPL-MCNC: 16 MG/DL (ref 5–25)
CALCIUM SERPL-MCNC: 9.8 MG/DL (ref 8.4–10.2)
CHLORIDE SERPL-SCNC: 104 MMOL/L (ref 96–108)
CO2 SERPL-SCNC: 31 MMOL/L (ref 21–32)
CREAT SERPL-MCNC: 0.78 MG/DL (ref 0.6–1.3)
EOSINOPHIL # BLD AUTO: 0.15 THOUSAND/ÂΜL (ref 0–0.61)
EOSINOPHIL NFR BLD AUTO: 2 % (ref 0–6)
ERYTHROCYTE [DISTWIDTH] IN BLOOD BY AUTOMATED COUNT: 13.7 % (ref 11.6–15.1)
GFR SERPL CREATININE-BSD FRML MDRD: 77 ML/MIN/1.73SQ M
GLUCOSE P FAST SERPL-MCNC: 99 MG/DL (ref 65–99)
HCT VFR BLD AUTO: 43.4 % (ref 34.8–46.1)
HGB BLD-MCNC: 13.9 G/DL (ref 11.5–15.4)
IMM GRANULOCYTES # BLD AUTO: 0.02 THOUSAND/UL (ref 0–0.2)
IMM GRANULOCYTES NFR BLD AUTO: 0 % (ref 0–2)
LYMPHOCYTES # BLD AUTO: 1.67 THOUSANDS/ÂΜL (ref 0.6–4.47)
LYMPHOCYTES NFR BLD AUTO: 27 % (ref 14–44)
MCH RBC QN AUTO: 30.3 PG (ref 26.8–34.3)
MCHC RBC AUTO-ENTMCNC: 32 G/DL (ref 31.4–37.4)
MCV RBC AUTO: 95 FL (ref 82–98)
MONOCYTES # BLD AUTO: 0.57 THOUSAND/ÂΜL (ref 0.17–1.22)
MONOCYTES NFR BLD AUTO: 9 % (ref 4–12)
NEUTROPHILS # BLD AUTO: 3.76 THOUSANDS/ÂΜL (ref 1.85–7.62)
NEUTS SEG NFR BLD AUTO: 61 % (ref 43–75)
NRBC BLD AUTO-RTO: 0 /100 WBCS
PLATELET # BLD AUTO: 285 THOUSANDS/UL (ref 149–390)
PMV BLD AUTO: 11.2 FL (ref 8.9–12.7)
POTASSIUM SERPL-SCNC: 4.4 MMOL/L (ref 3.5–5.3)
PROT SERPL-MCNC: 6.9 G/DL (ref 6.4–8.4)
RBC # BLD AUTO: 4.59 MILLION/UL (ref 3.81–5.12)
SODIUM SERPL-SCNC: 138 MMOL/L (ref 135–147)
WBC # BLD AUTO: 6.22 THOUSAND/UL (ref 4.31–10.16)

## 2023-10-13 PROCEDURE — 36415 COLL VENOUS BLD VENIPUNCTURE: CPT

## 2023-10-13 PROCEDURE — 85025 COMPLETE CBC W/AUTO DIFF WBC: CPT

## 2023-10-13 PROCEDURE — 80053 COMPREHEN METABOLIC PANEL: CPT

## 2023-10-13 NOTE — PRE-PROCEDURE INSTRUCTIONS
Pre-Surgery Instructions:   Medication Instructions    Cholecalciferol 50 MCG (2000 UT) CAPS Stop taking 7 days prior to surgery. famotidine (PEPCID) 40 MG tablet Take day of surgery. GELATIN PO Stop taking 7 days prior to surgery. lisinopril (ZESTRIL) 30 mg tablet Take night before surgery    rosuvastatin (CRESTOR) 5 mg tablet Take night before surgery    sertraline (ZOLOFT) 100 mg tablet Take day of surgery. sertraline (ZOLOFT) 25 mg tablet Take day of surgery. vitamin B-12 (VITAMIN B-12) 1,000 mcg tablet Stop taking 7 days prior to surgery. Medication instructions for day surgery reviewed. Please use only a sip of water to take your instructed medications. Avoid all over the counter vitamins, supplements and NSAIDS for one week prior to surgery per anesthesia guidelines. Tylenol is ok to take as needed. You will receive a call one business day prior to surgery with an arrival time and hospital directions. If your surgery is scheduled on a Monday, the hospital will be calling you on the Friday prior to your surgery. If you have not heard from anyone by 8pm, please call the hospital supervisor through the hospital  at [phone]. Tony Castellanos [phone]). Do not eat or drink anything after midnight the night before your surgery, including candy, mints, lifesavers, or chewing gum. Do not drink alcohol 24hrs before your surgery. Try not to smoke at least 24hrs before your surgery. Follow the pre surgery showering instructions as listed in the [facility] Surgical Experience Booklet” or otherwise provided by your surgeon's office. Do not shave the surgical area 24 hours before surgery. Do not apply any lotions, creams, including makeup, cologne, deodorant, or perfumes after showering on the day of your surgery. No contact lenses, eye make-up, or artificial eyelashes. Remove nail polish, including gel polish, and any artificial, gel, or acrylic nails if possible.  Remove all jewelry including rings and body piercing jewelry. Wear causal clothing that is easy to take on and off. Consider your type of surgery. Keep any valuables, jewelry, piercings at home. Please bring any specially ordered equipment (sling, braces) if indicated. Arrange for a responsible person to drive you to and from the hospital on the day of your surgery. Visitor Guidelines discussed. Call the surgeon's office with any new illnesses, exposures, or additional questions prior to surgery. Please reference your [facility] Surgical Experience Booklet” for additional information to prepare for your upcoming surgery. Advise Refer to Weight Management Program not interested.

## 2023-10-16 ENCOUNTER — ANESTHESIA EVENT (OUTPATIENT)
Dept: PERIOP | Facility: AMBULARY SURGERY CENTER | Age: 73
End: 2023-10-16
Payer: MEDICARE

## 2023-10-16 ENCOUNTER — TELEMEDICINE (OUTPATIENT)
Dept: GYNECOLOGY | Facility: CLINIC | Age: 73
End: 2023-10-16
Payer: MEDICARE

## 2023-10-16 DIAGNOSIS — Z01.818 VISIT FOR PRE-OPERATIVE EXAMINATION: Primary | ICD-10-CM

## 2023-10-16 PROCEDURE — 99441 PR PHYS/QHP TELEPHONE EVALUATION 5-10 MIN: CPT | Performed by: OBSTETRICS & GYNECOLOGY

## 2023-10-16 NOTE — PROGRESS NOTES
Virtual Brief Visit    This Visit is being completed by telephone. The Patient is located at Other and in the following state in which I hold an active license [place]    The patient was identified by name and date of birth. Lorie Villegas was informed that this is a telemedicine visit and that the visit is being conducted through the [employer]. She agrees to proceed. .  My office door was closed. No one else was in the room. She acknowledged consent and understanding of privacy and security of the video platform. The patient has agreed to participate and understands they can discontinue the visit at any time. Patient is aware this is a billable service. Patient is scheduled for a hysteroscopy D&C for postmenopausal bleeding on October 24, 2023. Surgery was explained postop course and follow-up discussed. Patient's questions were answered. Problem List Items Addressed This Visit    None      Recent Visits  No visits were found meeting these conditions. Showing recent visits within past 7 days and meeting all other requirements  Today's Visits  Date Type Provider Dept   10/16/23 [place] [id number] [facility] today's visits and meeting all other requirements  Future Appointments  No visits were found meeting these conditions.   Showing future appointments within next 150 days and meeting all other requirements         Visit Time  Total Visit Duration: 5 min

## 2023-10-18 PROCEDURE — NC001 PR NO CHARGE: Performed by: OBSTETRICS & GYNECOLOGY

## 2023-10-18 NOTE — H&P
H&P Exam - Gynecology   Keila Cruz 81 y.o. female MRN: [id number]  Unit/Bed#:  Encounter: [id number]    Assessment/Plan     Assessment: Thickened endometrium     Plan: Hysteroscopy, D&C    HPI:  Keila Cruz is a 81 y.o. female who presents with left lower quadrant pain. She was seen in the emergency room and evaluated in October 2023. She has a history of diverticulosis. No diverticulitis was diagnosed at the time of her visit. Pelvic ultrasound showed the uterus to be 9.9 x 4.9 x 6.5 cm with a 1 cm fibroid endometrial stripe was thickened at 1.4 cm. Ovaries were not visualized. She denies any vaginal bleeding at this time. She had 2 prior endometrial biopsy for postmenopausal bleeding both of which were benign. Review of Systems   Constitutional: Negative. Negative for fatigue, fever and unexpected weight change. HENT: Negative. Eyes: Negative. Respiratory: Negative. Negative for chest tightness, shortness of breath, wheezing and stridor. Cardiovascular: Negative. Negative for chest pain, palpitations and leg swelling. Gastrointestinal: Negative. Negative for abdominal pain, blood in stool, diarrhea, nausea, rectal pain and vomiting. Endocrine: Negative. Genitourinary:  Negative for dysuria, frequency, vaginal bleeding, vaginal discharge and vaginal pain. Musculoskeletal: Negative. Skin: Negative. Allergic/Immunologic: Negative. Neurological: Negative. Hematological: Negative. Psychiatric/Behavioral: Negative. All other systems reviewed and are negative.       Historical Information   Past Medical History:   Diagnosis Date    Anxiety     Cervical herniated disc     9/3/21  good ROM-getting PT    Colon polyp     CPAP (continuous positive airway pressure) dependence     Depression     Fatty liver     GERD (gastroesophageal reflux disease)     Hyperlipidemia     Hypertension     Sleep apnea     Syncope and collapse     9/3/21  episode of passing out 3-4 weeks ago, awoke quickly; injured tailbone, did not go to ER. Past Surgical History:   Procedure Laterality Date    COLONOSCOPY  2016    polyp    COLONOSCOPY  2021    polyp-repeat in 5 years    CYSTOSCOPY      [place] HYSTEROSCOPY BX ENDOMETRIUM&/POLYPC W/WO D&C N/A 2016    Procedure: HYSTEROSCOPY; [address]; POLYPECTOMY ;  Surgeon: Jose Starr MD;  [facility];  Service: Gynecology    UPPER GASTROINTESTINAL ENDOSCOPY       OB/GYN History:     Family History   Problem Relation Age of Onset    Ulcerative colitis Mother     COPD Mother     Heart disease Father      Social History   Social History     Substance and Sexual Activity   Alcohol Use Yes    Alcohol/week: 7.0 standard drinks of alcohol    Types: 7 Standard drinks or equivalent per week    Comment: cocktail nightly-1     Social History     Substance and Sexual Activity   Drug Use Never     Social History     Tobacco Use   Smoking Status Former    Packs/day: 0.50    Types: Cigarettes    Quit date:     Years since quittin.8   Smokeless Tobacco Never     E-Cigarette/Vaping    E-Cigarette Use Never User      E-Cigarette/Vaping Substances    Nicotine No     THC No     CBD No     Flavoring No     Other No     Unknown No        Meds/Allergies   all current active meds have been reviewed  Allergies   Allergen Reactions    Prevacid [Lansoprazole] Rash       Objective   Vitals: Last menstrual period 2002. No intake or output data in the 24 hours ending 10/18/23 1012    Invasive Devices: Invasive Devices       None                   Physical Exam  HENT:      Head: Normocephalic. Nose: Nose normal.   Eyes:      Pupils: Pupils are equal, round, and reactive to light. Cardiovascular:      Rate and Rhythm: Normal rate and regular rhythm. Pulses: Normal pulses. Heart sounds: Normal heart sounds. No murmur heard.   Pulmonary:      Effort: Pulmonary effort is normal.      Breath sounds: Normal breath sounds. Abdominal:      General: Abdomen is flat. Palpations: Abdomen is soft. Genitourinary:     Comments: Vaginal atrophy    Musculoskeletal:         General: Normal range of motion. Cervical back: Normal range of motion and neck supple. Skin:     General: Skin is warm. Neurological:      General: No focal deficit present. Mental Status: She is alert. Psychiatric:         Mood and Affect: Mood normal.         Behavior: Behavior normal.         Thought Content: Thought content normal.         Lab Results: I have personally reviewed pertinent reports. Imaging: I have personally reviewed pertinent reports. EKG, Pathology, and Other Studies: I have personally reviewed pertinent reports.

## 2023-10-24 ENCOUNTER — ANESTHESIA (OUTPATIENT)
Dept: PERIOP | Facility: AMBULARY SURGERY CENTER | Age: 73
End: 2023-10-24
Payer: MEDICARE

## 2023-10-24 ENCOUNTER — HOSPITAL ENCOUNTER (OUTPATIENT)
Facility: AMBULARY SURGERY CENTER | Age: 73
Setting detail: OUTPATIENT SURGERY
Discharge: HOME | End: 2023-10-24
Attending: OBSTETRICS & GYNECOLOGY | Admitting: OBSTETRICS & GYNECOLOGY
Payer: MEDICARE

## 2023-10-24 VITALS
RESPIRATION RATE: 20 BRPM | HEART RATE: 64 BPM | TEMPERATURE: 97.6 F | SYSTOLIC BLOOD PRESSURE: 128 MMHG | DIASTOLIC BLOOD PRESSURE: 67 MMHG | HEIGHT: 64 IN | WEIGHT: 255 LBS | OXYGEN SATURATION: 96 % | BODY MASS INDEX: 43.54 KG/M2

## 2023-10-24 DIAGNOSIS — R93.89 THICKENED ENDOMETRIUM: ICD-10-CM

## 2023-10-24 PROBLEM — Z98.890 STATUS POST HYSTEROSCOPY: Status: ACTIVE | Noted: 2023-10-24

## 2023-10-24 PROCEDURE — 88305 TISSUE EXAM BY PATHOLOGIST: CPT | Performed by: STUDENT IN AN ORGANIZED HEALTH CARE EDUCATION/TRAINING PROGRAM

## 2023-10-24 PROCEDURE — 58558 HYSTEROSCOPY BIOPSY: CPT | Performed by: OBSTETRICS & GYNECOLOGY

## 2023-10-24 RX ORDER — SODIUM CHLORIDE, SODIUM LACTATE, POTASSIUM CHLORIDE, CALCIUM CHLORIDE 600; 310; 30; 20 MG/100ML; MG/100ML; MG/100ML; MG/100ML
100 INJECTION, SOLUTION INTRAVENOUS CONTINUOUS
Status: DISCONTINUED | OUTPATIENT
Start: 2023-10-24 | End: 2023-10-24 | Stop reason: HOSPADM

## 2023-10-24 RX ORDER — ONDANSETRON 2 MG/ML
4 INJECTION INTRAMUSCULAR; INTRAVENOUS ONCE AS NEEDED
Status: DISCONTINUED | OUTPATIENT
Start: 2023-10-24 | End: 2023-10-24 | Stop reason: HOSPADM

## 2023-10-24 RX ORDER — SODIUM CHLORIDE, SODIUM LACTATE, POTASSIUM CHLORIDE, CALCIUM CHLORIDE 600; 310; 30; 20 MG/100ML; MG/100ML; MG/100ML; MG/100ML
INJECTION, SOLUTION INTRAVENOUS CONTINUOUS PRN
Status: DISCONTINUED | OUTPATIENT
Start: 2023-10-24 | End: 2023-10-24

## 2023-10-24 RX ORDER — IBUPROFEN 600 MG/1
600 TABLET ORAL EVERY 6 HOURS PRN
Status: CANCELLED | OUTPATIENT
Start: 2023-10-24

## 2023-10-24 RX ORDER — KETOROLAC TROMETHAMINE 30 MG/ML
INJECTION, SOLUTION INTRAMUSCULAR; INTRAVENOUS AS NEEDED
Status: DISCONTINUED | OUTPATIENT
Start: 2023-10-24 | End: 2023-10-24

## 2023-10-24 RX ORDER — FENTANYL CITRATE/PF 50 MCG/ML
25 SYRINGE (ML) INJECTION
Status: DISCONTINUED | OUTPATIENT
Start: 2023-10-24 | End: 2023-10-24 | Stop reason: HOSPADM

## 2023-10-24 RX ORDER — DEXAMETHASONE SODIUM PHOSPHATE 10 MG/ML
INJECTION, SOLUTION INTRAMUSCULAR; INTRAVENOUS AS NEEDED
Status: DISCONTINUED | OUTPATIENT
Start: 2023-10-24 | End: 2023-10-24

## 2023-10-24 RX ORDER — PROPOFOL 10 MG/ML
INJECTION, EMULSION INTRAVENOUS AS NEEDED
Status: DISCONTINUED | OUTPATIENT
Start: 2023-10-24 | End: 2023-10-24

## 2023-10-24 RX ORDER — ONDANSETRON 2 MG/ML
INJECTION INTRAMUSCULAR; INTRAVENOUS AS NEEDED
Status: DISCONTINUED | OUTPATIENT
Start: 2023-10-24 | End: 2023-10-24

## 2023-10-24 RX ORDER — LIDOCAINE HYDROCHLORIDE 10 MG/ML
INJECTION, SOLUTION EPIDURAL; INFILTRATION; INTRACAUDAL; PERINEURAL AS NEEDED
Status: DISCONTINUED | OUTPATIENT
Start: 2023-10-24 | End: 2023-10-24

## 2023-10-24 RX ORDER — MAGNESIUM HYDROXIDE 1200 MG/15ML
LIQUID ORAL AS NEEDED
Status: DISCONTINUED | OUTPATIENT
Start: 2023-10-24 | End: 2023-10-24 | Stop reason: HOSPADM

## 2023-10-24 RX ORDER — FENTANYL CITRATE 50 UG/ML
INJECTION, SOLUTION INTRAMUSCULAR; INTRAVENOUS AS NEEDED
Status: DISCONTINUED | OUTPATIENT
Start: 2023-10-24 | End: 2023-10-24

## 2023-10-24 RX ORDER — ACETAMINOPHEN 325 MG/1
975 TABLET ORAL EVERY 6 HOURS PRN
Status: DISCONTINUED | OUTPATIENT
Start: 2023-10-24 | End: 2023-10-24 | Stop reason: HOSPADM

## 2023-10-24 RX ORDER — HYDROMORPHONE HCL/PF 1 MG/ML
0.5 SYRINGE (ML) INJECTION
Status: DISCONTINUED | OUTPATIENT
Start: 2023-10-24 | End: 2023-10-24 | Stop reason: HOSPADM

## 2023-10-24 RX ADMIN — ACETAMINOPHEN 975 MG: 325 TABLET, FILM COATED ORAL at 11:09

## 2023-10-24 RX ADMIN — ONDANSETRON 4 MG: 2 INJECTION INTRAMUSCULAR; INTRAVENOUS at 10:05

## 2023-10-24 RX ADMIN — FENTANYL CITRATE 25 MCG: 50 INJECTION INTRAMUSCULAR; INTRAVENOUS at 10:05

## 2023-10-24 RX ADMIN — FENTANYL CITRATE 50 MCG: 50 INJECTION INTRAMUSCULAR; INTRAVENOUS at 10:03

## 2023-10-24 RX ADMIN — LIDOCAINE HYDROCHLORIDE 50 MG: 10 INJECTION, SOLUTION EPIDURAL; INFILTRATION; INTRACAUDAL; PERINEURAL at 10:00

## 2023-10-24 RX ADMIN — FENTANYL CITRATE 25 MCG: 50 INJECTION INTRAMUSCULAR; INTRAVENOUS at 10:08

## 2023-10-24 RX ADMIN — DEXAMETHASONE SODIUM PHOSPHATE 10 MG: 10 INJECTION, SOLUTION INTRAMUSCULAR; INTRAVENOUS at 10:05

## 2023-10-24 RX ADMIN — SODIUM CHLORIDE, SODIUM LACTATE, POTASSIUM CHLORIDE, AND CALCIUM CHLORIDE: .6; .31; .03; .02 INJECTION, SOLUTION INTRAVENOUS at 09:11

## 2023-10-24 RX ADMIN — KETOROLAC TROMETHAMINE 30 MG: 30 INJECTION, SOLUTION INTRAMUSCULAR; INTRAVENOUS at 10:20

## 2023-10-24 RX ADMIN — PROPOFOL 200 MG: 10 INJECTION, EMULSION INTRAVENOUS at 10:00

## 2023-10-24 NOTE — ANESTHESIA POSTPROCEDURE EVALUATION
Post-Op Assessment Note    CV Status:  Stable  Pain Score: 0    Pain management: adequate     Mental Status:  Alert and awake   Hydration Status:  Euvolemic   PONV Controlled:  Controlled   Airway Patency:  Patent      Post Op Vitals Reviewed: Yes      Staff: CRNA         No notable events documented.     /71 (10/24/23 1030)    Temp (!) 97.1 °F (36.2 °C) (10/24/23 1030)    Pulse 60 (10/24/23 1030)   Resp 15 (10/24/23 1030)    SpO2 98 % (10/24/23 1030)

## 2023-10-24 NOTE — INTERVAL H&P NOTE
H&P reviewed. After examining the patient I find no changes in the patients condition since the H&P had been written.     Vitals:    10/24/23 0911   BP: 162/95   Pulse: 74   Resp: 16   Temp: (!) 97.1 °F (36.2 °C)   SpO2: 96%

## 2023-10-24 NOTE — OP NOTE
OPERATIVE REPORT  PATIENT NAME: Lorie Pina    :  1950  MRN: [id number]  Pt Location: [facility] OR [facility]OM 04    SURGERY DATE: 10/24/2023    Surgeon(s) and Role:     * Shahla Rawls MD - Primary     * Sean Lau MD - Assisting    Preop Diagnosis: Thickened endometrium [R93.89]    Post-Op Diagnosis Codes: * Thickened endometrium [R93.89]    Procedure(s):  DILATATION AND CURETTAGE (D&C) WITH HYSTEROSCOPY. ENDOMETRIAL BIOPSY    Specimen(s):  ID Type Source Tests Collected by Time Destination   1 : endometrial curettings Tissue Endometrium TISSUE EXAM Shahla Rawls MD 10/24/2023 1015        Estimated Blood Loss:   Minimal    Drains:  none    Anesthesia Type:   General LMA    Operative Indications: Thickened endometrium [R93.89]        Operative findings  Atrophic labia and vaginal mucosa  Grossly normal cervical mucosa   Large uterus with grossly normal ostia bilaterally   Small area of thickened endometrial lining on the left lateral wall    Procedure (OR)  The patient was correctly identified as and taken to the OR where general anesthesia was obtained without difficulty. She was placed in the dorsal lithotomy position and was prepped and draped in a sterile fashion. The pt was examined under anesthesia and was found to have an anteverted uterus with normal adnexa. A weighted speculum and Laws retractor was inserted into the vagina and the anterior lip of the cervix was visualized and grasped with a single toothed tenaculum. Uterus was sounded to 11 cm. Using Beau Abdi dilators, the cervix was progressively dilated to about 18 [place]. A 5 mm 0 degree Hysteroscope was inserted. The entire endometrial cavity was visualized as well as bilateral ostia. The endometrial cavity appeared mostly unremarkable with a small area of thickened endometrial tissue on the left lateral wall. Hysteroscope was removed. The uterine cavity was systematically curetted with a sharp curette and the curettings sent for analysis. The tenaculum and retractor were removed. There was no bleeding noted from tenaculum site. The patient tolerated the procedure well. Sponge and instrument count were correct x 3. Complications:   None apparent     Patient Disposition:  PACU       Dr. Darshan Maxwell was present for the entirety of the procedure.        SIGNATURE: Anthony Payton MD  DATE: October 24, 2023  TIME: 10:28 AM

## 2023-10-24 NOTE — ANESTHESIA PREPROCEDURE EVALUATION
Procedure:  DILATATION AND CURETTAGE (D&C) WITH HYSTEROSCOPY (Uterus)    Relevant Problems   CARDIO   (+) Hypertension      NEURO/PSYCH   (+) Chronic left shoulder pain   (+) Depression      PULMONARY   (+) ИРИНА (obstructive sleep apnea)   (+) Sleep apnea        Physical Exam    Airway    Mallampati score: II  TM Distance: >3 FB  Neck ROM: full     Dental   No notable dental hx     Cardiovascular  Rhythm: regular, Rate: normal    Pulmonary   Breath sounds clear to auscultation    Other Findings        Anesthesia Plan  ASA Score- 3     Anesthesia Type- general with ASA Monitors. Additional Monitors:     Airway Plan: LMA. Plan Factors-Exercise tolerance (METS): >4 METS. Chart reviewed. EKG reviewed. Imaging results reviewed. Existing labs reviewed. Patient summary reviewed. Patient is not a current smoker. Obstructive sleep apnea risk education given perioperatively. Induction- intravenous. Postoperative Plan- Plan for postoperative opioid use. Informed Consent- Anesthetic plan and risks discussed with patient. I personally reviewed this patient with the CRNA. Discussed and agreed on the Anesthesia Plan with the CRNA. Yolanda Figueroa

## 2023-10-26 PROCEDURE — 88305 TISSUE EXAM BY PATHOLOGIST: CPT | Performed by: STUDENT IN AN ORGANIZED HEALTH CARE EDUCATION/TRAINING PROGRAM

## 2023-11-14 ENCOUNTER — OFFICE VISIT (OUTPATIENT)
Dept: GYNECOLOGY | Facility: CLINIC | Age: 73
End: 2023-11-14

## 2023-11-14 VITALS
BODY MASS INDEX: 43.54 KG/M2 | HEIGHT: 64 IN | WEIGHT: 255 LBS | DIASTOLIC BLOOD PRESSURE: 80 MMHG | SYSTOLIC BLOOD PRESSURE: 144 MMHG

## 2023-11-14 DIAGNOSIS — Z48.89 POSTOPERATIVE VISIT: Primary | ICD-10-CM

## 2023-11-14 PROCEDURE — 99024 POSTOP FOLLOW-UP VISIT: CPT | Performed by: OBSTETRICS & GYNECOLOGY

## 2023-11-14 NOTE — PROGRESS NOTES
Returns the office postop visit. Status post hysteroscopy D&C October 24, 2023. This was done for thickened endometrial lining noted on ultrasound. Had no bleeding or pain. Pathology showed predominantly mucus. Strips of benign squamous epithelium were noted. No definitive endometrial tissue was noted. Photos from surgery were shown which depicted no thickened endometrial lining. 1 area on the left side of the uterus tissue but with scant. Impression: Stable status post hysteroscopy D&C with benign findings. Plan: Observe for any vaginal bleeding.   Return to office for annual exam.

## 2023-11-16 ENCOUNTER — ESTABLISHED COMPREHENSIVE EXAM (OUTPATIENT)
Dept: URBAN - METROPOLITAN AREA CLINIC 6 | Facility: CLINIC | Age: 73
End: 2023-11-16

## 2023-11-16 DIAGNOSIS — H02.885: ICD-10-CM

## 2023-11-16 DIAGNOSIS — H35.362: ICD-10-CM

## 2023-11-16 DIAGNOSIS — H02.882: ICD-10-CM

## 2023-11-16 DIAGNOSIS — H25.13: ICD-10-CM

## 2023-11-16 PROCEDURE — 92014 COMPRE OPH EXAM EST PT 1/>: CPT

## 2023-11-16 ASSESSMENT — VISUAL ACUITY
OS_SC: 20/25
OD_SC: 20/25

## 2023-11-16 ASSESSMENT — TONOMETRY
OS_IOP_MMHG: 16
OD_IOP_MMHG: 17

## 2023-12-01 ENCOUNTER — TRANSCRIBE ORDERS (OUTPATIENT)
Dept: LAB | Facility: CLINIC | Age: 73
End: 2023-12-01

## 2023-12-01 ENCOUNTER — APPOINTMENT (OUTPATIENT)
Dept: LAB | Facility: CLINIC | Age: 73
End: 2023-12-01
Payer: MEDICARE

## 2023-12-01 DIAGNOSIS — E78.2 MIXED HYPERLIPIDEMIA: ICD-10-CM

## 2023-12-01 DIAGNOSIS — R73.01 IMPAIRED FASTING GLUCOSE: ICD-10-CM

## 2023-12-01 DIAGNOSIS — I10 ESSENTIAL HYPERTENSION, MALIGNANT: ICD-10-CM

## 2023-12-01 DIAGNOSIS — R16.0 HEPATOMEGALY: ICD-10-CM

## 2023-12-01 DIAGNOSIS — K76.0 FATTY LIVER: ICD-10-CM

## 2023-12-01 DIAGNOSIS — Z11.59 ENCOUNTER FOR SCREENING FOR OTHER VIRAL DISEASES: ICD-10-CM

## 2023-12-01 DIAGNOSIS — I10 ESSENTIAL HYPERTENSION, MALIGNANT: Primary | ICD-10-CM

## 2023-12-01 LAB
ALBUMIN SERPL BCP-MCNC: 4.2 G/DL (ref 3.5–5)
ALP SERPL-CCNC: 52 U/L (ref 34–104)
ALT SERPL W P-5'-P-CCNC: 29 U/L (ref 7–52)
ANION GAP SERPL CALCULATED.3IONS-SCNC: 8 MMOL/L
AST SERPL W P-5'-P-CCNC: 27 U/L (ref 13–39)
BASOPHILS # BLD AUTO: 0.1 THOUSANDS/ÂΜL (ref 0–0.1)
BASOPHILS NFR BLD AUTO: 2 % (ref 0–1)
BILIRUB SERPL-MCNC: 0.52 MG/DL (ref 0.2–1)
BUN SERPL-MCNC: 15 MG/DL (ref 5–25)
CALCIUM SERPL-MCNC: 10.4 MG/DL (ref 8.4–10.2)
CHLORIDE SERPL-SCNC: 104 MMOL/L (ref 96–108)
CHOLEST SERPL-MCNC: 131 MG/DL
CO2 SERPL-SCNC: 28 MMOL/L (ref 21–32)
CREAT SERPL-MCNC: 0.86 MG/DL (ref 0.6–1.3)
EOSINOPHIL # BLD AUTO: 0.1 THOUSAND/ÂΜL (ref 0–0.61)
EOSINOPHIL NFR BLD AUTO: 2 % (ref 0–6)
ERYTHROCYTE [DISTWIDTH] IN BLOOD BY AUTOMATED COUNT: 13.7 % (ref 11.6–15.1)
EST. AVERAGE GLUCOSE BLD GHB EST-MCNC: 131 MG/DL
GFR SERPL CREATININE-BSD FRML MDRD: 68 ML/MIN/1.73SQ M
GLUCOSE P FAST SERPL-MCNC: 105 MG/DL (ref 65–99)
HBA1C MFR BLD: 6.2 %
HCT VFR BLD AUTO: 44.4 % (ref 34.8–46.1)
HDLC SERPL-MCNC: 30 MG/DL
HGB BLD-MCNC: 14.4 G/DL (ref 11.5–15.4)
IMM GRANULOCYTES # BLD AUTO: 0.02 THOUSAND/UL (ref 0–0.2)
IMM GRANULOCYTES NFR BLD AUTO: 0 % (ref 0–2)
LDLC SERPL CALC-MCNC: 74 MG/DL (ref 0–100)
LYMPHOCYTES # BLD AUTO: 1.8 THOUSANDS/ÂΜL (ref 0.6–4.47)
LYMPHOCYTES NFR BLD AUTO: 29 % (ref 14–44)
MCH RBC QN AUTO: 30.9 PG (ref 26.8–34.3)
MCHC RBC AUTO-ENTMCNC: 32.4 G/DL (ref 31.4–37.4)
MCV RBC AUTO: 95 FL (ref 82–98)
MONOCYTES # BLD AUTO: 0.59 THOUSAND/ÂΜL (ref 0.17–1.22)
MONOCYTES NFR BLD AUTO: 10 % (ref 4–12)
NEUTROPHILS # BLD AUTO: 3.56 THOUSANDS/ÂΜL (ref 1.85–7.62)
NEUTS SEG NFR BLD AUTO: 57 % (ref 43–75)
NONHDLC SERPL-MCNC: 101 MG/DL
NRBC BLD AUTO-RTO: 0 /100 WBCS
PLATELET # BLD AUTO: 316 THOUSANDS/UL (ref 149–390)
PMV BLD AUTO: 11 FL (ref 8.9–12.7)
POTASSIUM SERPL-SCNC: 4.5 MMOL/L (ref 3.5–5.3)
PROT SERPL-MCNC: 6.7 G/DL (ref 6.4–8.4)
RBC # BLD AUTO: 4.66 MILLION/UL (ref 3.81–5.12)
SODIUM SERPL-SCNC: 140 MMOL/L (ref 135–147)
TRIGL SERPL-MCNC: 133 MG/DL
WBC # BLD AUTO: 6.17 THOUSAND/UL (ref 4.31–10.16)

## 2023-12-01 PROCEDURE — 80053 COMPREHEN METABOLIC PANEL: CPT

## 2023-12-01 PROCEDURE — 83036 HEMOGLOBIN GLYCOSYLATED A1C: CPT

## 2023-12-01 PROCEDURE — 80061 LIPID PANEL: CPT

## 2023-12-01 PROCEDURE — 36415 COLL VENOUS BLD VENIPUNCTURE: CPT

## 2023-12-01 PROCEDURE — 85025 COMPLETE CBC W/AUTO DIFF WBC: CPT

## 2024-01-02 ENCOUNTER — NURSE TRIAGE (OUTPATIENT)
Age: 74
End: 2024-01-02

## 2024-01-02 NOTE — TELEPHONE ENCOUNTER
Pt returned Anu Tucker's call. I informed her I would send a message and have Anu get back to her.

## 2024-01-02 NOTE — TELEPHONE ENCOUNTER
Regarding: Abdominal pain  ----- Message from Selena Cobos sent at 1/2/2024  3:46 PM EST -----  Pt called requesting an appointment with Dr Bermeo. Pt stated she is having left lower abdominal pain.

## 2024-01-02 NOTE — TELEPHONE ENCOUNTER
Pt last seen 2/16/23 with hx of diverticulitis. Pt last had diverticulitis in November. She was treated with abx. Pt has chronic LLQ pain that is intermittent. Pt can go 1-2 weeks without pain and then will have pain for a day. Today she had pain 6/10 for about 3 hours. Denies other symptoms other than nausea but no alarming symptoms.     Pt advised liquid diet and scheduled for OV. Pt only wants to see Dr. Bermeo. Scheduled for 1/22. Pt aware of alarm symptoms that would prompt ED     Reason for Disposition   Affirmative: The patient has nausea    Answer Questions - Initial Assessment  When did the pain start: pt states pain is chronic and comes and goes every 1-2 weeks.     Is the pain constant or intermittent: intermittent    Does the pain radiate: pain does not radiate    Is your LLQ pain tender to touch or worsens after pressing on the abdomen: yes    Do you have a history of diverticulitis: Yes; abx treatment in NovemberABDOMINAL SWELLING: No    Are you passing gas: yes    Have you noticed a change in your bowels: no    Does anything make the pain worse: no    Any black or bloody stools: No    Do you have a fever: No or low grade fever (<100°F)    Are you able to eat and drink without difficulty: yes    What was your prior treatment for diverticulitis: abx    Protocols used: LLQ Pain

## 2024-01-02 NOTE — TELEPHONE ENCOUNTER
Reason for Disposition  • No answer.  First attempt to contact caller.  Follow-up call scheduled within 15 minutes.    Protocols used: No Contact or Duplicate Contact Call-ADULT-[place]

## 2024-01-08 ENCOUNTER — OFFICE VISIT (OUTPATIENT)
Dept: URGENT CARE | Facility: CLINIC | Age: 74
End: 2024-01-08
Payer: MEDICARE

## 2024-01-08 VITALS
TEMPERATURE: 98.2 F | OXYGEN SATURATION: 94 % | RESPIRATION RATE: 20 BRPM | SYSTOLIC BLOOD PRESSURE: 130 MMHG | HEART RATE: 90 BPM | DIASTOLIC BLOOD PRESSURE: 70 MMHG

## 2024-01-08 DIAGNOSIS — K57.92 DIVERTICULITIS: Primary | ICD-10-CM

## 2024-01-08 PROCEDURE — G0463 HOSPITAL OUTPT CLINIC VISIT: HCPCS | Performed by: NURSE PRACTITIONER

## 2024-01-08 PROCEDURE — 99213 OFFICE O/P EST LOW 20 MIN: CPT | Performed by: NURSE PRACTITIONER

## 2024-01-08 RX ORDER — METRONIDAZOLE 500 MG/1
500 TABLET ORAL EVERY 8 HOURS SCHEDULED
Qty: 30 TABLET | Refills: 0 | Status: SHIPPED | OUTPATIENT
Start: 2024-01-08 | End: 2024-01-18

## 2024-01-08 RX ORDER — CIPROFLOXACIN 500 MG/1
500 TABLET, FILM COATED ORAL EVERY 12 HOURS SCHEDULED
Qty: 20 TABLET | Refills: 0 | Status: SHIPPED | OUTPATIENT
Start: 2024-01-08 | End: 2024-01-18

## 2024-01-08 NOTE — PROGRESS NOTES
[facility]        NAME: Chani Mccoy is a 73 y.o. female  : 1950    MRN: [id number]  DATE: 2024  TIME: 9:44 AM    Assessment and Plan   Diverticulitis [K57.92]  1. Diverticulitis  ciprofloxacin (CIPRO) 500 mg tablet    metroNIDAZOLE (FLAGYL) 500 mg tablet        --Symptoms typical of prior episode of diverticulitis.  No red flags noted at this time.   Will therefore treat cautiously as outpatient at this time, with low threshold for going to ER.      Patient Instructions     --Take antibiotic as prescribed  --Consider clear liquids x 2 days, then gradually advance to low residue/diverticulosis diet (seen handout for additional information).   --Go to ER if no improvement/worsening over the next 24-72 hours despite these measures.   --Follow-up with GI for recurrent issues in the future.      Chief Complaint     Chief Complaint   Patient presents with    Abdominal Pain     States she was diagnosed with diverticulitis in November. Then mid December started with pain again off and on. Called GI  but cannot get in until . Now for the last 6 days having constant pain.          History of Present Illness       Here with complaints of LLQ abdominal pain x 1 week.  Felt on/off for a few weeks prior to that, but has been constant since then.    Aching sensation felt LLQ with radiation to suprapubic region with no radiation to remainder of abdomen or elsewhere.   No variation with eating, position, defecation, urinating.    No bowel changes noted including C/D, melena, hematochezia.  Had normal BM yesterday.    No urinary changes noted.     No spotting, discharge,   No fever/chills, N/V.  Appetite decreased a bit.  No bloating .   Feels exactly the same as prior episode of diverticulitis diagnosed by CT scan 23.  Resolved with oral antibiotics.    Colonoscopy  showed polyp, diverticulosis, per patient.    Denies history of other GI, GYN,  conditions.    Tried calling  both PCP and GI.  Unable to see her in timely fashion, so she decided to come here.    No recent dietary changes, excess alcohol.          Review of Systems   Review of Systems   Constitutional:  Negative for fever.   Gastrointestinal:  Positive for abdominal pain. Negative for blood in stool, constipation, diarrhea, nausea and vomiting.   Genitourinary:  Negative for difficulty urinating.   Musculoskeletal:  Negative for back pain.         Current Medications       Current Outpatient Medications:     Cholecalciferol 50 MCG (2000 UT) CAPS, Take 1 capsule by mouth daily, Disp: , Rfl:     ciprofloxacin (CIPRO) 500 mg tablet, Take 1 tablet (500 mg total) by mouth every 12 (twelve) hours for 10 days, Disp: 20 tablet, Rfl: 0    famotidine (PEPCID) 40 MG tablet, Take 40 mg by mouth 2 (two) times a day, Disp: , Rfl:     GELATIN PO, Take 2,000 mg by mouth, Disp: , Rfl:     lisinopril (ZESTRIL) 30 mg tablet, Take 30 mg by mouth daily at bedtime, Disp: , Rfl:     metroNIDAZOLE (FLAGYL) 500 mg tablet, Take 1 tablet (500 mg total) by mouth every 8 (eight) hours for 10 days, Disp: 30 tablet, Rfl: 0    rosuvastatin (CRESTOR) 5 mg tablet, Take 5 mg by mouth daily at bedtime, Disp: , Rfl:     sertraline (ZOLOFT) 100 mg tablet, Take 100 mg by mouth daily., Disp: , Rfl:     sertraline (ZOLOFT) 25 mg tablet, TAKE ONE TABLET BY MOUTH EVERY DAY. TAKE WITH 100MG TABLETS., Disp: , Rfl:     vitamin B-12 (VITAMIN B-12) 1,000 mcg tablet, Take 1,000 mcg by mouth daily, Disp: , Rfl:     Current Allergies     Allergies as of 01/08/2024 - Reviewed 01/08/2024   Allergen Reaction Noted    Prevacid [lansoprazole] Rash 09/01/2016            The following portions of the patient's history were reviewed and updated as appropriate: allergies, current medications, past family history, past medical history, past social history, past surgical history and problem list.     Past Medical History:   Diagnosis Date    Anxiety     Cervical herniated disc      9/3/21  good ROM-getting PT    Colon polyp     CPAP (continuous positive airway pressure) dependence     Depression     Fatty liver     GERD (gastroesophageal reflux disease)     Hyperlipidemia     Hypertension     Sleep apnea     Syncope and collapse     9/3/21  episode of passing out 3-4 weeks ago, awoke quickly; injured tailbone, did not go to ER.       Past Surgical History:   Procedure Laterality Date    COLONOSCOPY  2016    polyp    COLONOSCOPY  09/03/2021    polyp-repeat in 5 years    CYSTOSCOPY      [place] HYSTEROSCOPY BX ENDOMETRIUM&/POLYPC W/WO D&C N/A 09/06/2016    Procedure: HYSTEROSCOPY; DILATAION & CURETTAGE; POLYPECTOMY ;  Surgeon: Alexandr Gar MD;  [facility];  Service: Gynecology    [place] HYSTEROSCOPY BX ENDOMETRIUM&/POLYPC W/WO D&C N/A 10/24/2023    Procedure: DILATATION AND CURETTAGE (D&C) WITH HYSTEROSCOPY, ENDOMETRIAL BIOPSY;  Surgeon: Alexandr Madrigal MD;  Location: [facility] OR;  Service: Gynecology    UPPER GASTROINTESTINAL ENDOSCOPY         Family History   Problem Relation Age of Onset    Ulcerative colitis Mother     COPD Mother     Heart disease Father          Medications have been verified.        Objective   /70   Pulse 90   Temp 98.2 °F (36.8 °C) (Temporal)   Resp 20   LMP 01/01/2002 (Approximate)   SpO2 94%   Patient's last menstrual period was 01/01/2002 (approximate).       Physical Exam     Physical Exam  Eyes:      General: No scleral icterus.  Pulmonary:      Effort: Pulmonary effort is normal.   Abdominal:      General: Abdomen is flat. Bowel sounds are normal.      Palpations: Abdomen is soft. There is no mass.      Tenderness: There is abdominal tenderness in the left lower quadrant. There is no guarding or rebound. Negative signs include Rovsing's sign, McBurney's sign and psoas sign.      Hernia: No hernia is present.   Neurological:      Mental Status: She is alert.

## 2024-01-08 NOTE — PATIENT INSTRUCTIONS
--Take antibiotic as prescribed  --Consider clear liquids x 2 days, then gradually advance to low residue/diverticulosis diet (seen handout for additional information).   --Go to ER if no improvement/worsening over the next 24-72 hours despite these measures.   --Follow-up with GI for recurrent issues in the future.

## 2024-01-16 ENCOUNTER — TELEPHONE (OUTPATIENT)
Dept: GASTROENTEROLOGY | Facility: CLINIC | Age: 74
End: 2024-01-16

## 2024-01-17 ENCOUNTER — APPOINTMENT (OUTPATIENT)
Dept: LAB | Facility: CLINIC | Age: 74
End: 2024-01-17
Payer: MEDICARE

## 2024-01-17 LAB
ALBUMIN SERPL BCP-MCNC: 4.4 G/DL (ref 3.5–5)
ALP SERPL-CCNC: 60 U/L (ref 34–104)
ALT SERPL W P-5'-P-CCNC: 30 U/L (ref 7–52)
ANA SER QL IA: POSITIVE
AST SERPL W P-5'-P-CCNC: 26 U/L (ref 13–39)
BILIRUB DIRECT SERPL-MCNC: 0.11 MG/DL (ref 0–0.2)
BILIRUB SERPL-MCNC: 0.41 MG/DL (ref 0.2–1)
HBV CORE AB SER QL: NORMAL
HBV CORE IGM SER QL: NORMAL
HBV SURFACE AG SER QL: NORMAL
HCV AB SER QL: NORMAL
PROT SERPL-MCNC: 6.8 G/DL (ref 6.4–8.4)

## 2024-01-17 PROCEDURE — 86704 HEP B CORE ANTIBODY TOTAL: CPT

## 2024-01-17 PROCEDURE — 86803 HEPATITIS C AB TEST: CPT

## 2024-01-17 PROCEDURE — 80076 HEPATIC FUNCTION PANEL: CPT

## 2024-01-17 PROCEDURE — 86038 ANTINUCLEAR ANTIBODIES: CPT

## 2024-01-17 PROCEDURE — 86039 ANTINUCLEAR ANTIBODIES (ANA): CPT

## 2024-01-17 PROCEDURE — 87340 HEPATITIS B SURFACE AG IA: CPT

## 2024-01-17 PROCEDURE — 86705 HEP B CORE ANTIBODY IGM: CPT

## 2024-01-19 LAB
ANA HOMOGEN SER QL IF: NORMAL
ANA HOMOGEN TITR SER: NORMAL {TITER}

## 2024-01-22 ENCOUNTER — OFFICE VISIT (OUTPATIENT)
Dept: GASTROENTEROLOGY | Facility: CLINIC | Age: 74
End: 2024-01-22
Payer: MEDICARE

## 2024-01-22 VITALS
WEIGHT: 247 LBS | SYSTOLIC BLOOD PRESSURE: 137 MMHG | HEIGHT: 64 IN | DIASTOLIC BLOOD PRESSURE: 89 MMHG | HEART RATE: 91 BPM | BODY MASS INDEX: 42.17 KG/M2

## 2024-01-22 DIAGNOSIS — K76.0 FATTY LIVER: ICD-10-CM

## 2024-01-22 DIAGNOSIS — K57.90 DIVERTICULAR DISEASE: ICD-10-CM

## 2024-01-22 DIAGNOSIS — Z86.010 HISTORY OF COLON POLYPS: ICD-10-CM

## 2024-01-22 DIAGNOSIS — R16.0 HEPATOMEGALY: ICD-10-CM

## 2024-01-22 DIAGNOSIS — K21.9 GASTROESOPHAGEAL REFLUX DISEASE WITHOUT ESOPHAGITIS: ICD-10-CM

## 2024-01-22 DIAGNOSIS — R10.30 LOWER ABDOMINAL PAIN: Primary | ICD-10-CM

## 2024-01-22 DIAGNOSIS — K57.32 SIGMOID DIVERTICULITIS: ICD-10-CM

## 2024-01-22 PROCEDURE — 99214 OFFICE O/P EST MOD 30 MIN: CPT | Performed by: INTERNAL MEDICINE

## 2024-01-22 RX ORDER — METFORMIN HYDROCHLORIDE 500 MG/1
500 TABLET, EXTENDED RELEASE ORAL
COMMUNITY
Start: 2023-12-07 | End: 2024-12-06

## 2024-01-22 NOTE — PROGRESS NOTES
[facility] [place] - Outpatient Follow-up Note  Chani Mccoy 73 y.o. female MRN: [id number]  Encounter: [id number]          ASSESSMENT AND PLAN:      1. Lower abdominal pain    2. Fatty liver    3. History of colon polyps    4. Hepatomegaly    5. Diverticular disease    6. Gastroesophageal reflux disease without esophagitis    7. BMI 40.0-44.9, adult (HCC)    8. Sigmoid diverticulitis      History of lower abdominal pain sigmoid diverticulosis and evidence of sigmoid diverticulitis, clinically no evidence of acute abdomen is obstruction at this time.  Just took a while for some diverticulitis to heal.  Continue same, may add Metamucil like fiber supplement to manage her bowels better.  Complications of diverticulitis discussed.    History of fatty liver, encouraged her to lose some weight.  Complications reviewed.    GERD asymptomatic 1 for famotidine today.  LFTs are normal.    ______________________________________________________________________    SUBJECTIVE:      Patient came in for evaluation of her lower abdominal pain, she was diagnosed with sigmoid diverticulitis given antibiotics, the pain got better but then recurred, now the pain is nearly subsided, the faster that happened on November 26.  He is moving her bowels regularly denies any blood in stools melena hematochezia fever chills rash GYN  problems appetite fair weight stable, denies dysphagia coughing choking spells, has some occasional heartburn indigestion, moves her bowels generally daily.  Diet medications more than 10 system reviewed.  Of the prior records noted.  Colonoscopy in September 23, 2021.      REVIEW OF SYSTEMS IS OTHERWISE NEGATIVE.      Historical Information   Past Medical History:   Diagnosis Date   • Anxiety    • Cervical herniated disc     9/3/21  good ROM-getting PT   • Colon polyp    • CPAP (continuous positive airway pressure) dependence    • Depression    • Diverticulitis of colon    • Fatty liver     • GERD (gastroesophageal reflux disease)    • Hyperlipidemia    • Hypertension    • Sleep apnea    • Syncope and collapse     9/3/21  episode of passing out 3-4 weeks ago, awoke quickly; injured tailbone, did not go to ER.     Past Surgical History:   Procedure Laterality Date   • COLONOSCOPY      polyp   • COLONOSCOPY  2021    polyp-repeat in 5 years   • CYSTOSCOPY     • [place] HYSTEROSCOPY BX ENDOMETRIUM&/POLYPC W/WO D&C N/A 2016    Procedure: HYSTEROSCOPY; DILATAION & CURETTAGE; POLYPECTOMY ;  Surgeon: Alexandr Gar MD;  [facility];  Service: Gynecology   • [place] HYSTEROSCOPY BX ENDOMETRIUM&/POLYPC W/WO D&C N/A 10/24/2023    Procedure: DILATATION AND CURETTAGE (D&C) WITH HYSTEROSCOPY, ENDOMETRIAL BIOPSY;  Surgeon: Alexandr Madrigal MD;  Location: [facility] MA[facility];  Service: Gynecology   • UPPER GASTROINTESTINAL ENDOSCOPY       Social History   Social History     Substance and Sexual Activity   Alcohol Use Yes   • Alcohol/week: 7.0 standard drinks of alcohol   • Types: 7 Standard drinks or equivalent per week    Comment: cocktail nightly-1     Social History     Substance and Sexual Activity   Drug Use Never     Social History     Tobacco Use   Smoking Status Former   • Current packs/day: 0.00   • Types: Cigarettes   • Quit date:    • Years since quittin.0   Smokeless Tobacco Never     Family History   Problem Relation Age of Onset   • Ulcerative colitis Mother    • COPD Mother    • Heart disease Father        Meds/Allergies       Current Outpatient Medications:   •  Cholecalciferol 50 MCG (2000) CAPS  •  famotidine (PEPCID) 40 MG tablet  •  GELATIN PO  •  lisinopril (ZESTRIL) 30 mg tablet  •  metFORMIN (GLUCOPHAGE-XR) 500 mg 24 hr tablet  •  rosuvastatin (CRESTOR) 5 mg tablet  •  sertraline (ZOLOFT) 100 mg tablet  •  sertraline (ZOLOFT) 25 mg tablet  •  vitamin B-12 (VITAMIN B-12) 1,000 mcg tablet    Allergies   Allergen Reactions   • Prevacid [Lansoprazole] Rash           Objective  "    Blood pressure 137/89, pulse 91, height 5' 4\" (1.626 m), weight 112 kg (247 lb), last menstrual period 01/01/2002. Body mass index is 42.4 kg/m².      PHYSICAL EXAM:      General Appearance:   Alert, cooperative, no distress   HEENT:   Normocephalic, atraumatic, anicteric.     Neck:  Supple, symmetrical, trachea midline   Lungs:   Clear to auscultation bilaterally; no rales, rhonchi or wheezing; respirations unlabored    Heart::   Regular rate and rhythm; no murmur.   Abdomen:   Soft, non-tender, non-distended; normal bowel sounds; no masses, no organomegaly    Genitalia:   Deferred    Rectal:   Deferred    Extremities:  No cyanosis, clubbing or edema    Skin:  No jaundice, rashes, or lesions    Lymph nodes:  No palpable cervical lymphadenopathy        Lab Results:   No visits with results within 1 Day(s) from this visit.   Latest known visit with results is:   Appointment on 12/01/2023   Component Date Value   • HOMER 01/17/2024 Positive (A)    • Hepatitis B Surface Ag 01/17/2024 Non-reactive    • Hepatitis C Ab 01/17/2024 Non-reactive    • Hep B C IgM 01/17/2024 Non-reactive    • Hep B Core Total Ab 01/17/2024 Non-reactive    • Total Bilirubin 01/17/2024 0.41    • Bilirubin, Direct 01/17/2024 0.11    • Alkaline Phosphatase 01/17/2024 60    • AST 01/17/2024 26    • ALT 01/17/2024 30    • Total Protein 01/17/2024 6.8    • Albumin 01/17/2024 4.4    • Sodium 12/01/2023 140    • Potassium 12/01/2023 4.5    • Chloride 12/01/2023 104    • CO2 12/01/2023 28    • ANION GAP 12/01/2023 8    • BUN 12/01/2023 15    • Creatinine 12/01/2023 0.86    • Glucose, Fasting 12/01/2023 105 (H)    • Calcium 12/01/2023 10.4 (H)    • AST 12/01/2023 27    • ALT 12/01/2023 29    • Alkaline Phosphatase 12/01/2023 52    • Total Protein 12/01/2023 6.7    • Albumin 12/01/2023 4.2    • Total Bilirubin 12/01/2023 0.52    • eGFR 12/01/2023 68    • Hemoglobin A1C 12/01/2023 6.2 (H)    • EAG 12/01/2023 131    • Cholesterol 12/01/2023 131    • " Triglycerides 12/01/2023 133    • HDL, Direct 12/01/2023 30 (L)    • LDL Calculated 12/01/2023 74    • Non-HDL-Chol (CHOL-HDL) 12/01/2023 101    • WBC 12/01/2023 6.17    • RBC 12/01/2023 4.66    • Hemoglobin 12/01/2023 14.4    • Hematocrit 12/01/2023 44.4    • MCV 12/01/2023 95    • MCH 12/01/2023 30.9    • MCHC 12/01/2023 32.4    • RDW 12/01/2023 13.7    • MPV 12/01/2023 11.0    • Platelets 12/01/2023 316    • nRBC 12/01/2023 0    • Neutrophils Relative 12/01/2023 57    • Immat GRANS % 12/01/2023 0    • Lymphocytes Relative 12/01/2023 29    • Monocytes Relative 12/01/2023 10    • Eosinophils Relative 12/01/2023 2    • Basophils Relative 12/01/2023 2 (H)    • Neutrophils Absolute 12/01/2023 3.56    • Immature Grans Absolute 12/01/2023 0.02    • Lymphocytes Absolute 12/01/2023 1.80    • Monocytes Absolute 12/01/2023 0.59    • Eosinophils Absolute 12/01/2023 0.10    • Basophils Absolute 12/01/2023 0.10    • HOMER Titer 1 01/17/2024 Titer of 40    • HOMER Pattern 1 01/17/2024 Un-typeable pattern          Radiology Results:   US KIDNEYS/RENAL    Result Date: 1/18/2024  Narrative: STUDY:   US KIDNEYS/RENAL INDICATION: Right renal lesion.   COMPARISON: CT of the abdomen and pelvis from 11/20/2023. FINDINGS: RIGHT KIDNEY: Size: 10.1 cm in length. Pelvicalyceal dilatation: None. Parenchyma: Within normal limits. Calculi: None. Masses: Towards the upper pole posteriorly is a hypoechoic lesion measuring 1.0 x 0.7 x 0.8 cm which is somewhat difficult to visualize however appears to represent a complex cyst. LEFT KIDNEY: Size: 10.7 cm in length. Pelvicalyceal dilatation: None. Parenchyma: Within normal limits. Calculi: None. Masses: At the midpole is a cyst measuring 2.6 x 2.1 x 2.6 cm with a few other tiny parapelvic cysts URINARY BLADDER: Unremarkable to visualized extent.     Impression: IMPRESSION: 1. In the right kidney towards the upper pole posteriorly is a hypoechoic lesion measuring 1.0 x 0.7 x 0.8 cm which is somewhat  difficult to visualize however appears to represent a complex cyst. If further confirmation and evaluation is needed, consider a follow-up contrast enhanced MRI of the abdomen versus a follow-up renal ultrasound study in 6 months. 2. In the left kidney towards the upper pole posteriorly is a cyst measuring 2.6 x 2.1 x 2.6 cm with a few other tiny parapelvic cysts. Workstation:[id number]

## 2024-06-20 ENCOUNTER — OFFICE VISIT (OUTPATIENT)
Dept: SLEEP CENTER | Facility: CLINIC | Age: 74
End: 2024-06-20
Payer: MEDICARE

## 2024-06-20 VITALS
DIASTOLIC BLOOD PRESSURE: 70 MMHG | SYSTOLIC BLOOD PRESSURE: 118 MMHG | WEIGHT: 243 LBS | HEIGHT: 64 IN | BODY MASS INDEX: 41.48 KG/M2 | OXYGEN SATURATION: 98 % | HEART RATE: 79 BPM

## 2024-06-20 DIAGNOSIS — G47.33 OSA (OBSTRUCTIVE SLEEP APNEA): Primary | ICD-10-CM

## 2024-06-20 DIAGNOSIS — K21.9 GASTROESOPHAGEAL REFLUX DISEASE, UNSPECIFIED WHETHER ESOPHAGITIS PRESENT: ICD-10-CM

## 2024-06-20 DIAGNOSIS — I10 PRIMARY HYPERTENSION: ICD-10-CM

## 2024-06-20 DIAGNOSIS — F32.A DEPRESSION, UNSPECIFIED DEPRESSION TYPE: ICD-10-CM

## 2024-06-20 DIAGNOSIS — R40.0 DAYTIME SLEEPINESS: ICD-10-CM

## 2024-06-20 DIAGNOSIS — E66.01 MORBID OBESITY (HCC): ICD-10-CM

## 2024-06-20 PROCEDURE — 99214 OFFICE O/P EST MOD 30 MIN: CPT | Performed by: INTERNAL MEDICINE

## 2024-06-20 PROCEDURE — G2211 COMPLEX E/M VISIT ADD ON: HCPCS | Performed by: INTERNAL MEDICINE

## 2024-06-20 NOTE — PROGRESS NOTES
Follow-Up Note - Sleep Center   Chani Mccoy  73 y.o. female  :1950  MRN:[id number]  DOS:2024    CC: I saw this patient for follow-up in clinic today for Sleep disordered breathing, Coexisting Sleep and Medical Problems.  She is using a ResMed machine.. Interval changes: None reported.    Results of a diagnostic study at [facility] in 2015:  AHI of 18 per hour.  There were also respiratory effort-related arousals resulting in an RDI of 21 per hour.  Minimum oxygen saturation was 89%.  There were mild periodic limb movements of sleep.  She was using BiPAP at 10/6 cm H2O and declined a retitration study.     PFSH, Problem List, Medications & Allergies were reviewed in EMR.   She  has a past medical history of Anxiety, Cervical herniated disc, Colon polyp, CPAP (continuous positive airway pressure) dependence, Depression, Diverticulitis of colon, Fatty liver, GERD (gastroesophageal reflux disease), Hyperlipidemia, Hypertension, Sleep apnea, and Syncope and collapse.    She has a current medication list which includes the following prescription(s): cholecalciferol, famotidine, gelatin, lisinopril, metformin, rosuvastatin, sertraline, sertraline, and vitamin b-12.    PHYSIOLOGICAL DATA REVIEW : Device has been used 30/30 days and average on days used is 4 hours and 59 minutes.  At times she dozes off in the lounge before applying her mass.   using PAP > 4 hours/night 70%. Estimated ESTEFANY 0.5/hour with pressure of 10/6cm H2O ; patient has not been using non FDA approved devices to sanitize the machine.  INTERPRETATION: Compliance is good; Pressure setting is:optimal; ;   SUBJECTIVE: With respect to use of PAP, Chani  is experiencing some adverse effects:mask causes redness / facial marks .She derives benefit.  Is satisfied with sleep and daytime function.   Sleep Routine: Chani reports getting 8 hrs sleep; she has no difficulty initiating or maintaining sleep . She arises spontaneously and feels  "more refreshed since on Rx.Chani reports excessive daytime sleepiness, feels like napping & does when has the opportunity 3-4 times a week for up to hour and a half.  She rated herself at Total score: 5 /24 on the [place] Sleepiness Scale.   Other issues: None reported.     Habits: Reports that she quit smoking about 44 years ago. Her smoking use included cigarettes. She has never used smokeless tobacco.,  Reports current alcohol use of about 7.0 standard drinks of alcohol per week.,  Reports no history of drug use., Caffeine use:limited; Exercise routine: none.      ROS: Significant for some intentional weight reduction.  Acid reflux is controlled.  She is reporting no nasal, respiratory or cardiac symptoms.  Mood is stable on sertraline..    EXAM: /70   Pulse 79   Ht 5' 4\" (1.626 m)   Wt 110 kg (243 lb)   LMP 01/01/2002 (Approximate)   SpO2 98%   BMI 41.71 kg/m²     Wt Readings from Last 3 Encounters:   06/20/24 110 kg (243 lb)   01/22/24 112 kg (247 lb)   11/14/23 116 kg (255 lb)      Patient is well groomed; well appearing.   CNS: Alert, orientated, speech clear & coherent  Psych: cooperative and in no distress. Mental state: Appears normal.  H&N: EOMI; NC/AT: No facial pressure marks, no rashes.    Skin/Extrem: col & hydration normal; no edema  Resp: Respiratory effort is normal  Physical findings otherwise essentially unchanged from previous.    IMPRESSION: Problem List Items & Comorbidities Addressed this Visit    1. ИРИНА (obstructive sleep apnea)  PAP DME Resupply/Reorder      2. Daytime sleepiness        3. Primary hypertension        4. Depression, unspecified depression type        5. Morbid obesity (HCC)        6. Gastroesophageal reflux disease, unspecified whether esophagitis present            PLAN:  I reviewed results of prior studies and physiologic data with the patient.   I discussed treatment options with risks and benefits.  Treatment with  PAP is medically necessary and Chani is " "agreable to continue use.   Care of equipment, methods to improve comfort using PAP and importance of compliance with therapy were discussed.  Pressure setting: continue 10/6 cmH2O and to be used for full sleep duration.    Rx provided to replace supplies and Care coordinated with DME provider.   For the facial marks, I suggested trying a CPAP pillow, REMZ or Suggies.  Discussed strategies for weight reduction.    Follow-up is advised in 1 year or sooner if needed to monitor progress, compliance and to adjust therapy.     Thank you for allowing me to participate in the care of this patient.    Sincerely,     Authenticated electronically on 06/20/24   Board Certified Specialist     Portions of the record may have been created with voice recognition software. Occasional wrong word or \"sound a like\" substitutions may have occurred due to the inherent limitations of voice recognition software. There may also be notations and random deletions of words or characters from malfunctioning software. Read the chart carefully and recognize, using context, where substitutions/deletions have occurred.    "

## 2024-06-22 ENCOUNTER — TELEPHONE (OUTPATIENT)
Dept: SLEEP CENTER | Facility: CLINIC | Age: 74
End: 2024-06-22

## 2024-06-25 LAB

## 2024-12-09 ENCOUNTER — TRANSCRIBE ORDERS (OUTPATIENT)
Dept: LAB | Facility: CLINIC | Age: 74
End: 2024-12-09

## 2024-12-09 ENCOUNTER — APPOINTMENT (OUTPATIENT)
Dept: LAB | Facility: CLINIC | Age: 74
End: 2024-12-09
Payer: MEDICARE

## 2024-12-09 DIAGNOSIS — R73.03 DIABETES MELLITUS, LATENT: Primary | ICD-10-CM

## 2024-12-09 DIAGNOSIS — E78.2 MIXED HYPERLIPIDEMIA: ICD-10-CM

## 2024-12-09 DIAGNOSIS — R73.03 DIABETES MELLITUS, LATENT: ICD-10-CM

## 2024-12-09 LAB
ALBUMIN SERPL BCG-MCNC: 4.5 G/DL (ref 3.5–5)
ALP SERPL-CCNC: 58 U/L (ref 34–104)
ALT SERPL W P-5'-P-CCNC: 15 U/L (ref 7–52)
ANION GAP SERPL CALCULATED.3IONS-SCNC: 6 MMOL/L (ref 4–13)
AST SERPL W P-5'-P-CCNC: 17 U/L (ref 13–39)
BILIRUB SERPL-MCNC: 0.5 MG/DL (ref 0.2–1)
BUN SERPL-MCNC: 16 MG/DL (ref 5–25)
CALCIUM SERPL-MCNC: 9.8 MG/DL (ref 8.4–10.2)
CHLORIDE SERPL-SCNC: 103 MMOL/L (ref 96–108)
CHOLEST SERPL-MCNC: 148 MG/DL (ref ?–200)
CO2 SERPL-SCNC: 30 MMOL/L (ref 21–32)
CREAT SERPL-MCNC: 0.83 MG/DL (ref 0.6–1.3)
EST. AVERAGE GLUCOSE BLD GHB EST-MCNC: 134 MG/DL
GFR SERPL CREATININE-BSD FRML MDRD: 71 ML/MIN/1.73SQ M
GLUCOSE P FAST SERPL-MCNC: 83 MG/DL (ref 65–99)
HBA1C MFR BLD: 6.3 %
HDLC SERPL-MCNC: 36 MG/DL
LDLC SERPL CALC-MCNC: 84 MG/DL (ref 0–100)
NONHDLC SERPL-MCNC: 112 MG/DL
POTASSIUM SERPL-SCNC: 4.2 MMOL/L (ref 3.5–5.3)
PROT SERPL-MCNC: 7.4 G/DL (ref 6.4–8.4)
SODIUM SERPL-SCNC: 139 MMOL/L (ref 135–147)
TRIGL SERPL-MCNC: 139 MG/DL (ref ?–150)

## 2024-12-09 PROCEDURE — 36415 COLL VENOUS BLD VENIPUNCTURE: CPT

## 2024-12-09 PROCEDURE — 80061 LIPID PANEL: CPT

## 2024-12-09 PROCEDURE — 83036 HEMOGLOBIN GLYCOSYLATED A1C: CPT

## 2024-12-09 PROCEDURE — 80053 COMPREHEN METABOLIC PANEL: CPT

## 2025-02-03 ENCOUNTER — TELEPHONE (OUTPATIENT)
Age: 75
End: 2025-02-03

## 2025-02-03 NOTE — TELEPHONE ENCOUNTER
Received call from Patient for New Patient Skin Check - Full Body Check. Scheduled 2/3/25 3:50 pm [facility] [place]. Verified insurance, provided [facility] [place] addr.     Patient verbalized understanding.

## 2025-04-16 ENCOUNTER — OFFICE VISIT (OUTPATIENT)
Age: 75
End: 2025-04-16

## 2025-04-16 VITALS
WEIGHT: 250 LBS | HEART RATE: 78 BPM | HEIGHT: 64 IN | SYSTOLIC BLOOD PRESSURE: 153 MMHG | BODY MASS INDEX: 42.68 KG/M2 | DIASTOLIC BLOOD PRESSURE: 83 MMHG

## 2025-04-16 DIAGNOSIS — K76.0 FATTY LIVER: ICD-10-CM

## 2025-04-16 DIAGNOSIS — K57.90 DIVERTICULAR DISEASE: ICD-10-CM

## 2025-04-16 DIAGNOSIS — R19.7 DIARRHEA, UNSPECIFIED TYPE: Primary | ICD-10-CM

## 2025-04-16 DIAGNOSIS — K21.9 GASTROESOPHAGEAL REFLUX DISEASE WITHOUT ESOPHAGITIS: ICD-10-CM

## 2025-04-16 NOTE — PROGRESS NOTES
Name: Chani Mccoy      : 1950      MRN: [id number]  Encounter Provider: Fidelia Moise PA-C  Encounter Date: 2025   Encounter department: [facility]  :  Assessment & Plan  Diarrhea, unspecified type  Patient reports ongoing issues with intermittent diarrhea but reports associated urgency over the last month. Reports she had two episodes of urgency over the last month after dining out at restaurants. Was previously recommended to try Metamucil but could not tolerate drinking this. She denies constipation, abdominal pain, blood in stools, unintentional weight loss, nocturnal symptoms. Symptoms seem to occur after dining out or eating dairy products but patient does try to take enzymes when eating lactose-containing foods. Patient did question if there may be an anxiety component to symptoms. She reports that over the last two weeks she has not had any issues with diarrhea/loose stools and has been have formed bowel movements every day.   - Recommend high fiber diet, reviewed dietary sources with patient  - Patient can try daily fiber gummies since she cannot tolerate drinking Metamucil  - Avoid lactose-containing foods, dietary sources reviewed with patient   - Could consider pelvic floor therapy if patient with further urgency/incontinence, discussed Kegel exercises to help strengthen pelvic floor muscles  - Patient to follow up in office in a few months, advised patient to contact the office if she has any worsening of symptoms        Gastroesophageal reflux disease without esophagitis  Patient reports symptoms are well-controlled with use of famotidine 40 mg PO BID.   - Continue famotidine as ordered  - Anti-reflux measures       Fatty liver  Evidence of hepatic steatosis on prior imaging. LFTs WNL on most recent CMP 2024.   - Weight loss encouraged       Diverticular disease  Noted to have acute, uncomplicated diverticulitis of proximal  sigmoid colon in 11/2023. Patient reports that she was treated with antibiotics and has not had any further issues.   - High fiber diet           History of Present Illness   Chani Mccoy is a 74 y.o. female who presents for office visit.  Her concerns today include diarrhea and fecal urgency.  She reports that she has always had issues with diarrhea/loose stools and attributed this to diet triggers such as lactose.  Over the last month or so she noticed more urgency when she has to move her bowels.  She reports that this has occurred twice over the last month and states that it usually occurs after she goes out to eat.  She reports that shortly after dining out she needs to urgently move her bowels and when she does go to the bathroom she passes loose stools.  There was one instance where she did not make it to the bathroom in time.  She reports that over the last two weeks she has not had any further episodes of diarrhea and has been having regular formed bowel movements daily.  She reports that she was having issues with loose stools at the time of last office visit 1/22/2024 and was recommended to try Metamucil but could not tolerate drinking it.  She reports that she was eating prunes instead but notes she has not been eating these recently but cannot remember when she stopped eating the prunes to correlate if it was at the same time as her diarrhea resolved.  She denies any issues with constipation.  She further denies nausea, vomiting, abdominal pain, blood in stool, unintentional weight loss, nocturnal symptoms, recent travel.  Her newest medication is metformin and she started this over a year ago.  She does report that she took amoxicillin about two weeks ago for UTI and took a probiotic while she was on antibiotic.  She drinks alcohol daily, states she drinks 1-1.5 Black Russian drinks per day.  Last colonoscopy in 2021 as below.  No known family history of colon cancer.       HPI    Review of Systems  A complete review of systems is negative other than that noted above in the HPI.         Objective   LMP 01/01/2002 (Approximate)     Physical Exam  Constitutional:       General: She is not in acute distress.     Appearance: Normal appearance.   Eyes:      Conjunctiva/sclera: Conjunctivae normal.   Cardiovascular:      Rate and Rhythm: Normal rate.   Pulmonary:      Effort: Pulmonary effort is normal. No respiratory distress.      Breath sounds: Normal breath sounds.   Abdominal:      General: Bowel sounds are normal.      Palpations: Abdomen is soft.      Tenderness: There is no abdominal tenderness. There is no guarding or rebound.   Musculoskeletal:      Cervical back: Neck supple.   Skin:     General: Skin is warm and dry.   Neurological:      General: No focal deficit present.      Mental Status: She is alert.   Psychiatric:         Mood and Affect: Mood normal.         Behavior: Behavior normal.          Lab Results: I personally reviewed relevant lab results.       Results for orders placed during the hospital encounter of 09/03/21    Colonoscopy    Impression  1. Small polyp removed.  2. Sigmoid diverticulosis.    RECOMMENDATION:  Repeat colonoscopy in 5 years due to a personal history of colon polyps.      Natanael Bermeo MD    Pathology from colonoscopy in 9/2021:   A. Rectum, cold snare rectum polyp:  - Hyperplastic polyp and prominent reactive lymphoid aggregate.   - Negative for dysplasia/ carcinoma.

## 2025-06-26 ENCOUNTER — TELEPHONE (OUTPATIENT)
Dept: SLEEP CENTER | Facility: CLINIC | Age: 75
End: 2025-06-26

## 2025-06-26 ENCOUNTER — OFFICE VISIT (OUTPATIENT)
Dept: SLEEP CENTER | Facility: CLINIC | Age: 75
End: 2025-06-26
Payer: MEDICARE

## 2025-06-26 VITALS
HEART RATE: 76 BPM | SYSTOLIC BLOOD PRESSURE: 148 MMHG | DIASTOLIC BLOOD PRESSURE: 92 MMHG | HEIGHT: 64 IN | WEIGHT: 252 LBS | BODY MASS INDEX: 43.02 KG/M2 | OXYGEN SATURATION: 96 %

## 2025-06-26 DIAGNOSIS — I10 PRIMARY HYPERTENSION: ICD-10-CM

## 2025-06-26 DIAGNOSIS — F32.A DEPRESSION, UNSPECIFIED DEPRESSION TYPE: ICD-10-CM

## 2025-06-26 DIAGNOSIS — R40.0 DAYTIME SLEEPINESS: ICD-10-CM

## 2025-06-26 DIAGNOSIS — G47.33 OSA (OBSTRUCTIVE SLEEP APNEA): Primary | ICD-10-CM

## 2025-06-26 DIAGNOSIS — E66.01 MORBID OBESITY (HCC): ICD-10-CM

## 2025-06-26 DIAGNOSIS — K21.9 GASTROESOPHAGEAL REFLUX DISEASE, UNSPECIFIED WHETHER ESOPHAGITIS PRESENT: ICD-10-CM

## 2025-06-26 DIAGNOSIS — Z78.9 DIFFICULTY USING CONTINUOUS POSITIVE AIRWAY PRESSURE (CPAP) NASAL MASK: ICD-10-CM

## 2025-06-26 PROCEDURE — G2211 COMPLEX E/M VISIT ADD ON: HCPCS | Performed by: INTERNAL MEDICINE

## 2025-06-26 PROCEDURE — 99214 OFFICE O/P EST MOD 30 MIN: CPT | Performed by: INTERNAL MEDICINE

## 2025-06-26 NOTE — PROGRESS NOTES
Name: Chani Mccoy      : 1950      MRN: [id number]  Encounter Provider: Zhen Hanson MD  Encounter Date: 2025   Encounter department: [facility]  :  Assessment & Plan  ИРИНА (obstructive sleep apnea)    Orders:    PAP DME Resupply/Reorder    Difficulty using continuous positive airway pressure (CPAP) nasal mask         Daytime sleepiness         Primary hypertension         Depression, unspecified depression type         Gastroesophageal reflux disease, unspecified whether esophagitis present         Morbid obesity (HCC)               PLAN: Above listed Problems & Comorbidities were Addressed this Visit.  Improved/stable/controlled/resolved conditions as reviewed in notes.   Results of prior studies and physiologic data reviewed  with the patient.   I discussed treatment options with risks and benefits.  Treatment with  PAP is medically necessary and Chani is agreable to continue use.   Care of equipment, methods to improve comfort using PAP and importance of compliance with therapy were discussed.  Pressure setting:continue 10/6 cmH2O. Mask type nasal full face  Rx provided to replace supplies and Care coordinated with DME provider for refitting of the interface.   Elevated blood pressure that is typically better when she is visiting with PCP.  She is due to follow-up and I suggested monitoring blood pressure at home.  Discussed strategies for weight reduction.   I also advised allowing sufficient opportunity for sleep regularly targeting upwards of 7 hours.  Follow-up is advised in 1 year or sooner if needed to monitor progress, compliance and to adjust therapy.        History of Present Illness   HPI          Follow-Up Note - Sleep Center   Chani Mccoy  74 y.o. female  :1950  MRN:[id number]  DOS:2025    CC: I saw this patient for follow-up in clinic today for Sleep disordered breathing, Coexisting Sleep and Medical Problems.  Patient is using a ResMed  machine.. Interval changes: None reported.      Results of a diagnostic study at [facility] in 2015:  AHI of 18 per hour.  There were also respiratory effort-related arousals resulting in an RDI of 21 per hour.  Minimum oxygen saturation was 89%.  There were mild periodic limb movements of sleep.  She was using BiPAP at 10/6 cm H2O and declined a retitration study.     PFSH, Problem List, Medications & Allergies were reviewed in EMR.   She  has a past medical history of Anxiety, Cervical herniated disc, Colon polyp, CPAP (continuous positive airway pressure) dependence, Depression, Diverticulitis of colon, Fatty liver, GERD (gastroesophageal reflux disease), Hyperlipidemia, Hypertension, Sleep apnea, and Syncope and collapse.    She has a current medication list which includes the following prescription(s): cholecalciferol, famotidine, gelatin, lisinopril, metformin, rosuvastatin, sertraline, sertraline, and vitamin b-12.    PHYSIOLOGICAL DATA REVIEW : Using PAP > 4 hours/night 80%. Estimated ESTEFANY 0.7/hour with pressure of 10/6cm H2O@90th/95th percentile;.  INTERPRETATION: Compliance is Very good; Pressure setting is:within target range; ;     SUBJECTIVE: With respect to use of PAP, Chani  is experiencing some adverse effects: mask discomfort.She derives benefit.. Is satisfied with sleep and daytime function.     Sleep Routine: Chani reports getting 6-7 hrs sleep; she has no difficulty initiating and maintaining sleep . She arises spontaneously and feels refreshed.Chani]reports excessive daytime sleepiness, takes planned naps for 1 to 1.5 hours.  She rated herself at Total score: 4 /24 on the [place] Sleepiness Scale.   Other issues: None reported.     Habits: Reports that she quit smoking about 45 years ago. Her smoking use included cigarettes. She has never used smokeless tobacco.,  Reports current alcohol use of about 7.0 standard drinks of alcohol per week.,  Reports no history of drug use., Caffeine  "use: moderate until 5 PM; Exercise routine: none.      ROS: Significant for almost 10 pounds weight gain in the past year.  Acid reflux is controlled.  Mood is stable on sertraline.  Review of systems was otherwise negative..    EXAM: /92 (BP Location: Left arm)   Pulse 76   Ht 5' 4\" (1.626 m)   Wt 114 kg (252 lb)   LMP 01/01/2002 (Approximate)   SpO2 96%   BMI 43.26 kg/m²     Wt Readings from Last 3 Encounters:   06/26/25 114 kg (252 lb)   04/16/25 113 kg (250 lb)   06/20/24 110 kg (243 lb)      Patient is well groomed; well appearing.   CNS: Alert, orientated, speech clear & coherent  Psych: cooperative and in no distress. Mental state: Appears normal.  H&N: EOMI; NC/AT: No facial pressure marks, no rashes.    Skin/Extrem: col & hydration normal; no edema  Resp: Respiratory effort is normal  Physical findings otherwise essentially unchanged from previous.    Lab Results   Component Value Date    SODIUM 139 12/09/2024    K 4.2 12/09/2024     12/09/2024    CO2 30 12/09/2024    AGAP 6 12/09/2024    BUN 16 12/09/2024    CREATININE 0.83 12/09/2024    GLUC 93 06/10/2024    GLUF 83 12/09/2024    CALCIUM 9.8 12/09/2024    AST 17 12/09/2024    ALT 15 12/09/2024    ALKPHOS 58 12/09/2024    TP 7.4 12/09/2024    TBILI 0.50 12/09/2024    EGFR 71 12/09/2024   ;   Lab Results   Component Value Date    WBC 6.17 12/01/2023    HGB 14.4 12/01/2023    HCT 44.4 12/01/2023    MCV 95 12/01/2023     12/01/2023   : [unfilled]    Sincerely,     Authenticated electronically on 06/26/25   Board Certified Specialist     Portions of the record may have been created with voice recognition software. Occasional wrong word or \"sound a like\" substitutions may have occurred due to the inherent limitations of voice recognition software. There may also be notations and random deletions of words or characters from malfunctioning software. Read the chart carefully and recognize, using context, where substitutions/deletions " have occurred.          Review of Systems

## 2025-07-14 ENCOUNTER — APPOINTMENT (OUTPATIENT)
Dept: LAB | Facility: CLINIC | Age: 75
End: 2025-07-14
Payer: MEDICARE

## 2025-07-14 ENCOUNTER — TRANSCRIBE ORDERS (OUTPATIENT)
Dept: LAB | Facility: CLINIC | Age: 75
End: 2025-07-14

## 2025-07-14 DIAGNOSIS — Z13.0 SCREENING, ANEMIA, DEFICIENCY, IRON: ICD-10-CM

## 2025-07-14 DIAGNOSIS — E78.2 MIXED HYPERLIPIDEMIA: ICD-10-CM

## 2025-07-14 DIAGNOSIS — R73.03 PRE-DIABETES: ICD-10-CM

## 2025-07-14 DIAGNOSIS — R73.03 PRE-DIABETES: Primary | ICD-10-CM

## 2025-07-14 LAB
ALBUMIN SERPL BCG-MCNC: 4.1 G/DL (ref 3.5–5)
ALP SERPL-CCNC: 56 U/L (ref 34–104)
ALT SERPL W P-5'-P-CCNC: 16 U/L (ref 7–52)
ANION GAP SERPL CALCULATED.3IONS-SCNC: 8 MMOL/L (ref 4–13)
AST SERPL W P-5'-P-CCNC: 16 U/L (ref 13–39)
BASOPHILS # BLD AUTO: 0.07 THOUSANDS/ÂΜL (ref 0–0.1)
BASOPHILS NFR BLD AUTO: 1 % (ref 0–1)
BILIRUB SERPL-MCNC: 0.57 MG/DL (ref 0.2–1)
BUN SERPL-MCNC: 15 MG/DL (ref 5–25)
CALCIUM SERPL-MCNC: 9.4 MG/DL (ref 8.4–10.2)
CHLORIDE SERPL-SCNC: 102 MMOL/L (ref 96–108)
CHOLEST SERPL-MCNC: 132 MG/DL (ref ?–200)
CO2 SERPL-SCNC: 31 MMOL/L (ref 21–32)
CREAT SERPL-MCNC: 0.87 MG/DL (ref 0.6–1.3)
EOSINOPHIL # BLD AUTO: 0.17 THOUSAND/ÂΜL (ref 0–0.61)
EOSINOPHIL NFR BLD AUTO: 3 % (ref 0–6)
ERYTHROCYTE [DISTWIDTH] IN BLOOD BY AUTOMATED COUNT: 13.8 % (ref 11.6–15.1)
EST. AVERAGE GLUCOSE BLD GHB EST-MCNC: 128 MG/DL
GFR SERPL CREATININE-BSD FRML MDRD: 66 ML/MIN/1.73SQ M
GLUCOSE P FAST SERPL-MCNC: 99 MG/DL (ref 65–99)
HBA1C MFR BLD: 6.1 %
HCT VFR BLD AUTO: 42.2 % (ref 34.8–46.1)
HDLC SERPL-MCNC: 34 MG/DL
HGB BLD-MCNC: 13.6 G/DL (ref 11.5–15.4)
IMM GRANULOCYTES # BLD AUTO: 0.02 THOUSAND/UL (ref 0–0.2)
IMM GRANULOCYTES NFR BLD AUTO: 0 % (ref 0–2)
LDLC SERPL CALC-MCNC: 71 MG/DL (ref 0–100)
LYMPHOCYTES # BLD AUTO: 2.01 THOUSANDS/ÂΜL (ref 0.6–4.47)
LYMPHOCYTES NFR BLD AUTO: 32 % (ref 14–44)
MCH RBC QN AUTO: 30.2 PG (ref 26.8–34.3)
MCHC RBC AUTO-ENTMCNC: 32.2 G/DL (ref 31.4–37.4)
MCV RBC AUTO: 94 FL (ref 82–98)
MONOCYTES # BLD AUTO: 0.56 THOUSAND/ÂΜL (ref 0.17–1.22)
MONOCYTES NFR BLD AUTO: 9 % (ref 4–12)
NEUTROPHILS # BLD AUTO: 3.55 THOUSANDS/ÂΜL (ref 1.85–7.62)
NEUTS SEG NFR BLD AUTO: 55 % (ref 43–75)
NONHDLC SERPL-MCNC: 98 MG/DL
NRBC BLD AUTO-RTO: 0 /100 WBCS
PLATELET # BLD AUTO: 282 THOUSANDS/UL (ref 149–390)
PMV BLD AUTO: 11 FL (ref 8.9–12.7)
POTASSIUM SERPL-SCNC: 4.3 MMOL/L (ref 3.5–5.3)
PROT SERPL-MCNC: 6.8 G/DL (ref 6.4–8.4)
RBC # BLD AUTO: 4.51 MILLION/UL (ref 3.81–5.12)
SODIUM SERPL-SCNC: 141 MMOL/L (ref 135–147)
TRIGL SERPL-MCNC: 137 MG/DL (ref ?–150)
WBC # BLD AUTO: 6.38 THOUSAND/UL (ref 4.31–10.16)

## 2025-07-14 PROCEDURE — 80053 COMPREHEN METABOLIC PANEL: CPT

## 2025-07-14 PROCEDURE — 80061 LIPID PANEL: CPT

## 2025-07-14 PROCEDURE — 36415 COLL VENOUS BLD VENIPUNCTURE: CPT

## 2025-07-14 PROCEDURE — 85025 COMPLETE CBC W/AUTO DIFF WBC: CPT

## (undated) DEVICE — STOPCOCK 4-WAY

## (undated) DEVICE — MAYO STAND COVER: Brand: CONVERTORS

## (undated) DEVICE — CHLORHEXIDINE 4PCT 4 OZ

## (undated) DEVICE — SYRINGE 50ML LL

## (undated) DEVICE — UNDER BUTTOCKS DRAPE W/FLUID CONTROL POUCH: Brand: CONVERTORS

## (undated) DEVICE — BETHLEHEM UNIVERSAL MINOR VAG: Brand: CARDINAL HEALTH

## (undated) DEVICE — STERILE SURGICAL LUBRICANT,  TUBE: Brand: SURGILUBE

## (undated) DEVICE — MAXI PAD5.51 X 13.78 IN. (14.0 X 35.0 CM)HEAVYCONTOUREDUNSCENTED: Brand: CURITY

## (undated) DEVICE — GLOVE SRG BIOGEL ORTHOPEDIC 7.5

## (undated) DEVICE — SPONGE LAP 18 X 18 IN

## (undated) DEVICE — Device: Brand: MEDEX

## (undated) DEVICE — IV SET 15 DROP 3/Y GRAVITY CARESITE

## (undated) DEVICE — PVC URETHRAL CATHETER: Brand: DOVER

## (undated) DEVICE — TUBING SUCTION 5MM X 12 FT

## (undated) DEVICE — PREMIUM DRY TRAY LF: Brand: MEDLINE INDUSTRIES, INC.

## (undated) RX ORDER — NEOMYCIN SULFATE, POLYMYXIN B SULFATE AND DEXAMETHASONE 3.5; 10000; 1 MG/ML; [USP'U]/ML; MG/ML: 1 SUSPENSION OPHTHALMIC